# Patient Record
Sex: FEMALE | Race: WHITE | Employment: OTHER | URBAN - METROPOLITAN AREA
[De-identification: names, ages, dates, MRNs, and addresses within clinical notes are randomized per-mention and may not be internally consistent; named-entity substitution may affect disease eponyms.]

---

## 2019-09-29 ENCOUNTER — HOSPITAL ENCOUNTER (INPATIENT)
Age: 74
LOS: 2 days | Discharge: HOME | DRG: 069 | End: 2019-10-01
Attending: EMERGENCY MEDICINE | Admitting: INTERNAL MEDICINE
Payer: MEDICARE

## 2019-09-29 ENCOUNTER — APPOINTMENT (OUTPATIENT)
Dept: CT IMAGING | Age: 74
DRG: 069 | End: 2019-09-29
Attending: EMERGENCY MEDICINE
Payer: MEDICARE

## 2019-09-29 DIAGNOSIS — G45.1 TIA INVOLVING CAROTID ARTERY: ICD-10-CM

## 2019-09-29 DIAGNOSIS — G45.9 TIA (TRANSIENT ISCHEMIC ATTACK): ICD-10-CM

## 2019-09-29 DIAGNOSIS — I63.9 CEREBROVASCULAR ACCIDENT (CVA), UNSPECIFIED MECHANISM (HCC): Primary | ICD-10-CM

## 2019-09-29 LAB
ALBUMIN SERPL-MCNC: 3.7 G/DL (ref 3.5–5)
ALBUMIN/GLOB SERPL: 1.1 {RATIO} (ref 1.1–2.2)
ALP SERPL-CCNC: 81 U/L (ref 45–117)
ALT SERPL-CCNC: 35 U/L (ref 12–78)
ANION GAP SERPL CALC-SCNC: 6 MMOL/L (ref 5–15)
AST SERPL-CCNC: 31 U/L (ref 15–37)
BASOPHILS # BLD: 0.1 K/UL (ref 0–0.1)
BASOPHILS NFR BLD: 1 % (ref 0–1)
BILIRUB SERPL-MCNC: 0.5 MG/DL (ref 0.2–1)
BUN SERPL-MCNC: 28 MG/DL (ref 6–20)
BUN/CREAT SERPL: 29 (ref 12–20)
CALCIUM SERPL-MCNC: 8.9 MG/DL (ref 8.5–10.1)
CHLORIDE SERPL-SCNC: 102 MMOL/L (ref 97–108)
CO2 SERPL-SCNC: 30 MMOL/L (ref 21–32)
COMMENT, HOLDF: NORMAL
CREAT SERPL-MCNC: 0.98 MG/DL (ref 0.55–1.02)
DIFFERENTIAL METHOD BLD: ABNORMAL
EOSINOPHIL # BLD: 0.3 K/UL (ref 0–0.4)
EOSINOPHIL NFR BLD: 4 % (ref 0–7)
ERYTHROCYTE [DISTWIDTH] IN BLOOD BY AUTOMATED COUNT: 13.2 % (ref 11.5–14.5)
GLOBULIN SER CALC-MCNC: 3.3 G/DL (ref 2–4)
GLUCOSE BLD STRIP.AUTO-MCNC: 117 MG/DL (ref 65–100)
GLUCOSE SERPL-MCNC: 91 MG/DL (ref 65–100)
HCT VFR BLD AUTO: 40.3 % (ref 35–47)
HGB BLD-MCNC: 12.8 G/DL (ref 11.5–16)
IMM GRANULOCYTES # BLD AUTO: 0 K/UL (ref 0–0.04)
IMM GRANULOCYTES NFR BLD AUTO: 1 % (ref 0–0.5)
LYMPHOCYTES # BLD: 1.8 K/UL (ref 0.8–3.5)
LYMPHOCYTES NFR BLD: 29 % (ref 12–49)
MCH RBC QN AUTO: 32 PG (ref 26–34)
MCHC RBC AUTO-ENTMCNC: 31.8 G/DL (ref 30–36.5)
MCV RBC AUTO: 100.8 FL (ref 80–99)
MONOCYTES # BLD: 0.7 K/UL (ref 0–1)
MONOCYTES NFR BLD: 12 % (ref 5–13)
NEUTS SEG # BLD: 3.3 K/UL (ref 1.8–8)
NEUTS SEG NFR BLD: 53 % (ref 32–75)
NRBC # BLD: 0 K/UL (ref 0–0.01)
NRBC BLD-RTO: 0 PER 100 WBC
PLATELET # BLD AUTO: 148 K/UL (ref 150–400)
PMV BLD AUTO: 11.2 FL (ref 8.9–12.9)
POTASSIUM SERPL-SCNC: 3.5 MMOL/L (ref 3.5–5.1)
PROT SERPL-MCNC: 7 G/DL (ref 6.4–8.2)
RBC # BLD AUTO: 4 M/UL (ref 3.8–5.2)
SAMPLES BEING HELD,HOLD: NORMAL
SERVICE CMNT-IMP: ABNORMAL
SODIUM SERPL-SCNC: 138 MMOL/L (ref 136–145)
WBC # BLD AUTO: 6.2 K/UL (ref 3.6–11)

## 2019-09-29 PROCEDURE — 65660000000 HC RM CCU STEPDOWN

## 2019-09-29 PROCEDURE — 74011250636 HC RX REV CODE- 250/636

## 2019-09-29 PROCEDURE — 70450 CT HEAD/BRAIN W/O DYE: CPT

## 2019-09-29 PROCEDURE — 85025 COMPLETE CBC W/AUTO DIFF WBC: CPT

## 2019-09-29 PROCEDURE — 70496 CT ANGIOGRAPHY HEAD: CPT

## 2019-09-29 PROCEDURE — 93005 ELECTROCARDIOGRAM TRACING: CPT

## 2019-09-29 PROCEDURE — 82962 GLUCOSE BLOOD TEST: CPT

## 2019-09-29 PROCEDURE — 0042T CT CODE NEURO PERF W CBF: CPT

## 2019-09-29 PROCEDURE — 96375 TX/PRO/DX INJ NEW DRUG ADDON: CPT

## 2019-09-29 PROCEDURE — 36415 COLL VENOUS BLD VENIPUNCTURE: CPT

## 2019-09-29 PROCEDURE — 74011000258 HC RX REV CODE- 258: Performed by: RADIOLOGY

## 2019-09-29 PROCEDURE — 74011250636 HC RX REV CODE- 250/636: Performed by: EMERGENCY MEDICINE

## 2019-09-29 PROCEDURE — 80053 COMPREHEN METABOLIC PANEL: CPT

## 2019-09-29 PROCEDURE — 96374 THER/PROPH/DIAG INJ IV PUSH: CPT

## 2019-09-29 PROCEDURE — 99285 EMERGENCY DEPT VISIT HI MDM: CPT

## 2019-09-29 RX ORDER — ATORVASTATIN CALCIUM 10 MG/1
10 TABLET, FILM COATED ORAL DAILY
COMMUNITY
End: 2019-10-01

## 2019-09-29 RX ORDER — DIPHENHYDRAMINE HYDROCHLORIDE 50 MG/ML
25 INJECTION, SOLUTION INTRAMUSCULAR; INTRAVENOUS
Status: COMPLETED | OUTPATIENT
Start: 2019-09-29 | End: 2019-09-29

## 2019-09-29 RX ORDER — BISACODYL 5 MG
5 TABLET, DELAYED RELEASE (ENTERIC COATED) ORAL DAILY PRN
Status: DISCONTINUED | OUTPATIENT
Start: 2019-09-29 | End: 2019-10-01 | Stop reason: HOSPADM

## 2019-09-29 RX ORDER — ATORVASTATIN CALCIUM 10 MG/1
10 TABLET, FILM COATED ORAL DAILY
Status: DISCONTINUED | OUTPATIENT
Start: 2019-09-30 | End: 2019-10-01 | Stop reason: HOSPADM

## 2019-09-29 RX ORDER — ACETAMINOPHEN 325 MG/1
650 TABLET ORAL
Status: DISCONTINUED | OUTPATIENT
Start: 2019-09-29 | End: 2019-10-01 | Stop reason: HOSPADM

## 2019-09-29 RX ORDER — LOSARTAN POTASSIUM 25 MG/1
25 TABLET ORAL DAILY
Status: ON HOLD | COMMUNITY
End: 2022-10-08

## 2019-09-29 RX ORDER — HYDRALAZINE HYDROCHLORIDE 20 MG/ML
10 INJECTION INTRAMUSCULAR; INTRAVENOUS
Status: DISCONTINUED | OUTPATIENT
Start: 2019-09-29 | End: 2019-10-01 | Stop reason: HOSPADM

## 2019-09-29 RX ORDER — BUMETANIDE 1 MG/1
1 TABLET ORAL DAILY
COMMUNITY

## 2019-09-29 RX ORDER — SODIUM CHLORIDE 0.9 % (FLUSH) 0.9 %
10 SYRINGE (ML) INJECTION
Status: COMPLETED | OUTPATIENT
Start: 2019-09-29 | End: 2019-09-30

## 2019-09-29 RX ORDER — SODIUM CHLORIDE 0.9 % (FLUSH) 0.9 %
10 SYRINGE (ML) INJECTION
Status: CANCELLED | OUTPATIENT
Start: 2019-09-29 | End: 2019-09-29

## 2019-09-29 RX ORDER — FAMOTIDINE 10 MG/ML
INJECTION INTRAVENOUS
Status: DISPENSED
Start: 2019-09-29 | End: 2019-09-30

## 2019-09-29 RX ORDER — BISOPROLOL FUMARATE 5 MG/1
10 TABLET ORAL ONCE
Status: DISCONTINUED | OUTPATIENT
Start: 2019-09-29 | End: 2019-09-30

## 2019-09-29 RX ORDER — DICYCLOMINE HYDROCHLORIDE 10 MG/1
10 CAPSULE ORAL AS NEEDED
Status: DISCONTINUED | OUTPATIENT
Start: 2019-09-29 | End: 2019-10-01 | Stop reason: HOSPADM

## 2019-09-29 RX ORDER — ONDANSETRON 2 MG/ML
4 INJECTION INTRAMUSCULAR; INTRAVENOUS
Status: DISCONTINUED | OUTPATIENT
Start: 2019-09-29 | End: 2019-10-01 | Stop reason: HOSPADM

## 2019-09-29 RX ORDER — LOSARTAN POTASSIUM 50 MG/1
25 TABLET ORAL DAILY
Status: DISCONTINUED | OUTPATIENT
Start: 2019-09-30 | End: 2019-09-30

## 2019-09-29 RX ORDER — GUAIFENESIN 100 MG/5ML
81 LIQUID (ML) ORAL DAILY
Status: DISCONTINUED | OUTPATIENT
Start: 2019-09-30 | End: 2019-10-01 | Stop reason: HOSPADM

## 2019-09-29 RX ORDER — HEPARIN SODIUM 5000 [USP'U]/ML
5000 INJECTION, SOLUTION INTRAVENOUS; SUBCUTANEOUS EVERY 8 HOURS
Status: DISCONTINUED | OUTPATIENT
Start: 2019-09-30 | End: 2019-10-01 | Stop reason: HOSPADM

## 2019-09-29 RX ORDER — DIPHENHYDRAMINE HYDROCHLORIDE 50 MG/ML
INJECTION, SOLUTION INTRAMUSCULAR; INTRAVENOUS
Status: COMPLETED
Start: 2019-09-29 | End: 2019-09-29

## 2019-09-29 RX ORDER — WATER FOR INJECTION,STERILE
VIAL (ML) INJECTION
Status: DISPENSED
Start: 2019-09-29 | End: 2019-09-30

## 2019-09-29 RX ORDER — BUMETANIDE 1 MG/1
1 TABLET ORAL DAILY
Status: DISCONTINUED | OUTPATIENT
Start: 2019-09-30 | End: 2019-10-01 | Stop reason: HOSPADM

## 2019-09-29 RX ORDER — BISOPROLOL FUMARATE 5 MG/1
10 TABLET ORAL 2 TIMES DAILY
Status: DISCONTINUED | OUTPATIENT
Start: 2019-09-30 | End: 2019-09-30

## 2019-09-29 RX ORDER — SODIUM CHLORIDE, SODIUM LACTATE, POTASSIUM CHLORIDE, CALCIUM CHLORIDE 600; 310; 30; 20 MG/100ML; MG/100ML; MG/100ML; MG/100ML
75 INJECTION, SOLUTION INTRAVENOUS CONTINUOUS
Status: DISCONTINUED | OUTPATIENT
Start: 2019-09-30 | End: 2019-10-01 | Stop reason: HOSPADM

## 2019-09-29 RX ORDER — ACETAMINOPHEN 650 MG/1
650 SUPPOSITORY RECTAL
Status: DISCONTINUED | OUTPATIENT
Start: 2019-09-29 | End: 2019-10-01 | Stop reason: HOSPADM

## 2019-09-29 RX ORDER — VERAPAMIL HYDROCHLORIDE 240 MG/1
240 TABLET, FILM COATED, EXTENDED RELEASE ORAL
Status: DISCONTINUED | OUTPATIENT
Start: 2019-09-29 | End: 2019-09-29

## 2019-09-29 RX ORDER — TRAZODONE HYDROCHLORIDE 50 MG/1
150 TABLET ORAL
Status: DISCONTINUED | OUTPATIENT
Start: 2019-09-29 | End: 2019-10-01 | Stop reason: HOSPADM

## 2019-09-29 RX ORDER — VERAPAMIL HYDROCHLORIDE 80 MG/1
120 TABLET ORAL
Status: DISCONTINUED | OUTPATIENT
Start: 2019-09-30 | End: 2019-09-30

## 2019-09-29 RX ADMIN — IOPAMIDOL 120 ML: 755 INJECTION, SOLUTION INTRAVENOUS at 20:17

## 2019-09-29 RX ADMIN — FAMOTIDINE 20 MG: 10 INJECTION, SOLUTION INTRAVENOUS at 20:45

## 2019-09-29 RX ADMIN — DIPHENHYDRAMINE HYDROCHLORIDE 25 MG: 50 INJECTION, SOLUTION INTRAMUSCULAR; INTRAVENOUS at 20:25

## 2019-09-29 RX ADMIN — METHYLPREDNISOLONE SODIUM SUCCINATE 125 MG: 125 INJECTION, POWDER, FOR SOLUTION INTRAMUSCULAR; INTRAVENOUS at 20:25

## 2019-09-29 RX ADMIN — SODIUM CHLORIDE 100 ML: 900 INJECTION, SOLUTION INTRAVENOUS at 20:17

## 2019-09-30 ENCOUNTER — APPOINTMENT (OUTPATIENT)
Dept: NON INVASIVE DIAGNOSTICS | Age: 74
DRG: 069 | End: 2019-09-30
Attending: INTERNAL MEDICINE
Payer: MEDICARE

## 2019-09-30 LAB
ALBUMIN SERPL-MCNC: 4.1 G/DL (ref 3.5–5)
ALBUMIN/GLOB SERPL: 1.2 {RATIO} (ref 1.1–2.2)
ALP SERPL-CCNC: 84 U/L (ref 45–117)
ALT SERPL-CCNC: 36 U/L (ref 12–78)
ANION GAP SERPL CALC-SCNC: 6 MMOL/L (ref 5–15)
AST SERPL-CCNC: 33 U/L (ref 15–37)
ATRIAL RATE: 55 BPM
BASOPHILS # BLD: 0.1 K/UL (ref 0–0.1)
BASOPHILS NFR BLD: 1 % (ref 0–1)
BILIRUB SERPL-MCNC: 0.6 MG/DL (ref 0.2–1)
BUN SERPL-MCNC: 28 MG/DL (ref 6–20)
BUN/CREAT SERPL: 26 (ref 12–20)
CALCIUM SERPL-MCNC: 9.6 MG/DL (ref 8.5–10.1)
CALCULATED P AXIS, ECG09: 54 DEGREES
CALCULATED R AXIS, ECG10: -83 DEGREES
CALCULATED T AXIS, ECG11: 81 DEGREES
CHLORIDE SERPL-SCNC: 104 MMOL/L (ref 97–108)
CHOLEST SERPL-MCNC: 167 MG/DL
CO2 SERPL-SCNC: 28 MMOL/L (ref 21–32)
CREAT SERPL-MCNC: 1.09 MG/DL (ref 0.55–1.02)
CRP SERPL-MCNC: <0.29 MG/DL (ref 0–0.6)
DIAGNOSIS, 93000: NORMAL
DIFFERENTIAL METHOD BLD: ABNORMAL
ECHO AO ROOT DIAM: 3.76 CM
ECHO AV AREA PEAK VELOCITY: 2.1 CM2
ECHO AV AREA VTI: 2.3 CM2
ECHO AV MEAN GRADIENT: 14 MMHG
ECHO AV PEAK GRADIENT: 27.1 MMHG
ECHO AV PEAK VELOCITY: 260.45 CM/S
ECHO AV VTI: 59.38 CM
ECHO EST RA PRESSURE: 10 MMHG
ECHO LA AREA 4C: 28.8 CM2
ECHO LA MAJOR AXIS: 4.62 CM
ECHO LA TO AORTIC ROOT RATIO: 1.23
ECHO LA VOL 2C: 106.27 ML (ref 22–52)
ECHO LA VOL 4C: 87.16 ML (ref 22–52)
ECHO LA VOL BP: 104.26 ML (ref 22–52)
ECHO LV E' LATERAL VELOCITY: 7.98 CM/S
ECHO LV E' SEPTAL VELOCITY: 5.94 CM/S
ECHO LV INTERNAL DIMENSION DIASTOLIC: 5.17 CM (ref 3.9–5.3)
ECHO LV INTERNAL DIMENSION SYSTOLIC: 3.5 CM
ECHO LV IVSD: 1.16 CM (ref 0.6–0.9)
ECHO LV MASS 2D: 259 G (ref 67–162)
ECHO LV POSTERIOR WALL DIASTOLIC: 1.04 CM (ref 0.6–0.9)
ECHO LVOT DIAM: 1.9 CM
ECHO LVOT PEAK GRADIENT: 14.8 MMHG
ECHO LVOT PEAK VELOCITY: 192.29 CM/S
ECHO LVOT SV: 133.8 ML
ECHO LVOT VTI: 47.22 CM
ECHO MV A VELOCITY: 99.5 CM/S
ECHO MV AREA PHT: 2 CM2
ECHO MV E DECELERATION TIME (DT): 378.1 MS
ECHO MV E VELOCITY: 84.72 CM/S
ECHO MV E/A RATIO: 0.85
ECHO MV E/E' LATERAL: 10.62
ECHO MV E/E' RATIO (AVERAGED): 12.44
ECHO MV E/E' SEPTAL: 14.26
ECHO MV PRESSURE HALF TIME (PHT): 109.6 MS
ECHO MV REGURGITANT PEAK GRADIENT: 164.4 MMHG
ECHO MV REGURGITANT PEAK VELOCITY: 641.13 CM/S
ECHO PULMONARY ARTERY SYSTOLIC PRESSURE (PASP): 70.2 MMHG
ECHO PV MAX VELOCITY: 119.14 CM/S
ECHO PV PEAK GRADIENT: 5.7 MMHG
ECHO RIGHT VENTRICULAR SYSTOLIC PRESSURE (RVSP): 70.2 MMHG
ECHO RV INTERNAL DIMENSION: 5.87 CM
ECHO RV TAPSE: 2.7 CM (ref 1.5–2)
ECHO TV REGURGITANT MAX VELOCITY: 387.89 CM/S
ECHO TV REGURGITANT PEAK GRADIENT: 60.2 MMHG
EOSINOPHIL # BLD: 0 K/UL (ref 0–0.4)
EOSINOPHIL NFR BLD: 0 % (ref 0–7)
ERYTHROCYTE [DISTWIDTH] IN BLOOD BY AUTOMATED COUNT: 12.9 % (ref 11.5–14.5)
ERYTHROCYTE [SEDIMENTATION RATE] IN BLOOD: 9 MM/HR (ref 0–30)
EST. AVERAGE GLUCOSE BLD GHB EST-MCNC: 123 MG/DL
GLOBULIN SER CALC-MCNC: 3.3 G/DL (ref 2–4)
GLUCOSE SERPL-MCNC: 206 MG/DL (ref 65–100)
HBA1C MFR BLD: 5.9 % (ref 4.2–6.3)
HCT VFR BLD AUTO: 42.5 % (ref 35–47)
HDLC SERPL-MCNC: 68 MG/DL
HDLC SERPL: 2.5 {RATIO} (ref 0–5)
HGB BLD-MCNC: 13.8 G/DL (ref 11.5–16)
IMM GRANULOCYTES # BLD AUTO: 0 K/UL
IMM GRANULOCYTES NFR BLD AUTO: 0 %
LDLC SERPL CALC-MCNC: 89 MG/DL (ref 0–100)
LIPID PROFILE,FLP: NORMAL
LYMPHOCYTES # BLD: 0.4 K/UL (ref 0.8–3.5)
LYMPHOCYTES NFR BLD: 7 % (ref 12–49)
MAGNESIUM SERPL-MCNC: 2.4 MG/DL (ref 1.6–2.4)
MCH RBC QN AUTO: 31.8 PG (ref 26–34)
MCHC RBC AUTO-ENTMCNC: 32.5 G/DL (ref 30–36.5)
MCV RBC AUTO: 97.9 FL (ref 80–99)
MONOCYTES # BLD: 0.2 K/UL (ref 0–1)
MONOCYTES NFR BLD: 3 % (ref 5–13)
NEUTS SEG # BLD: 5.7 K/UL (ref 1.8–8)
NEUTS SEG NFR BLD: 89 % (ref 32–75)
NRBC # BLD: 0 K/UL (ref 0–0.01)
NRBC BLD-RTO: 0 PER 100 WBC
P-R INTERVAL, ECG05: 208 MS
PHOSPHATE SERPL-MCNC: 3 MG/DL (ref 2.6–4.7)
PLATELET # BLD AUTO: 150 K/UL (ref 150–400)
PMV BLD AUTO: 10.9 FL (ref 8.9–12.9)
POTASSIUM SERPL-SCNC: 3.8 MMOL/L (ref 3.5–5.1)
PROT SERPL-MCNC: 7.4 G/DL (ref 6.4–8.2)
Q-T INTERVAL, ECG07: 526 MS
QRS DURATION, ECG06: 178 MS
QTC CALCULATION (BEZET), ECG08: 503 MS
RBC # BLD AUTO: 4.34 M/UL (ref 3.8–5.2)
RBC MORPH BLD: ABNORMAL
SODIUM SERPL-SCNC: 138 MMOL/L (ref 136–145)
TRIGL SERPL-MCNC: 50 MG/DL (ref ?–150)
TROPONIN I SERPL-MCNC: <0.05 NG/ML
TROPONIN I SERPL-MCNC: <0.05 NG/ML
TSH SERPL DL<=0.05 MIU/L-ACNC: 0.61 UIU/ML (ref 0.36–3.74)
VENTRICULAR RATE, ECG03: 55 BPM
VIT B12 SERPL-MCNC: 452 PG/ML (ref 193–986)
VLDLC SERPL CALC-MCNC: 10 MG/DL
WBC # BLD AUTO: 6.4 K/UL (ref 3.6–11)

## 2019-09-30 PROCEDURE — 96374 THER/PROPH/DIAG INJ IV PUSH: CPT

## 2019-09-30 PROCEDURE — 85025 COMPLETE CBC W/AUTO DIFF WBC: CPT

## 2019-09-30 PROCEDURE — 86140 C-REACTIVE PROTEIN: CPT

## 2019-09-30 PROCEDURE — 97161 PT EVAL LOW COMPLEX 20 MIN: CPT

## 2019-09-30 PROCEDURE — 80053 COMPREHEN METABOLIC PANEL: CPT

## 2019-09-30 PROCEDURE — 82607 VITAMIN B-12: CPT

## 2019-09-30 PROCEDURE — 97530 THERAPEUTIC ACTIVITIES: CPT

## 2019-09-30 PROCEDURE — 80061 LIPID PANEL: CPT

## 2019-09-30 PROCEDURE — 36415 COLL VENOUS BLD VENIPUNCTURE: CPT

## 2019-09-30 PROCEDURE — 97165 OT EVAL LOW COMPLEX 30 MIN: CPT

## 2019-09-30 PROCEDURE — 84484 ASSAY OF TROPONIN QUANT: CPT

## 2019-09-30 PROCEDURE — 74011250636 HC RX REV CODE- 250/636: Performed by: INTERNAL MEDICINE

## 2019-09-30 PROCEDURE — 92610 EVALUATE SWALLOWING FUNCTION: CPT | Performed by: SPEECH-LANGUAGE PATHOLOGIST

## 2019-09-30 PROCEDURE — 83036 HEMOGLOBIN GLYCOSYLATED A1C: CPT

## 2019-09-30 PROCEDURE — 74011250637 HC RX REV CODE- 250/637: Performed by: INTERNAL MEDICINE

## 2019-09-30 PROCEDURE — 65660000000 HC RM CCU STEPDOWN

## 2019-09-30 PROCEDURE — 97116 GAIT TRAINING THERAPY: CPT

## 2019-09-30 PROCEDURE — 74011636320 HC RX REV CODE- 636/320: Performed by: RADIOLOGY

## 2019-09-30 PROCEDURE — 74011250637 HC RX REV CODE- 250/637: Performed by: PSYCHIATRY & NEUROLOGY

## 2019-09-30 PROCEDURE — 84100 ASSAY OF PHOSPHORUS: CPT

## 2019-09-30 PROCEDURE — 83735 ASSAY OF MAGNESIUM: CPT

## 2019-09-30 PROCEDURE — 85652 RBC SED RATE AUTOMATED: CPT

## 2019-09-30 PROCEDURE — 84443 ASSAY THYROID STIM HORMONE: CPT

## 2019-09-30 RX ORDER — CLOPIDOGREL BISULFATE 75 MG/1
75 TABLET ORAL DAILY
Status: DISCONTINUED | OUTPATIENT
Start: 2019-09-30 | End: 2019-10-01 | Stop reason: HOSPADM

## 2019-09-30 RX ORDER — LABETALOL HYDROCHLORIDE 5 MG/ML
5 INJECTION, SOLUTION INTRAVENOUS
Status: DISCONTINUED | OUTPATIENT
Start: 2019-09-30 | End: 2019-10-01 | Stop reason: HOSPADM

## 2019-09-30 RX ADMIN — TRAZODONE HYDROCHLORIDE 150 MG: 50 TABLET ORAL at 02:19

## 2019-09-30 RX ADMIN — CLOPIDOGREL BISULFATE 75 MG: 75 TABLET ORAL at 11:43

## 2019-09-30 RX ADMIN — BUMETANIDE 1 MG: 1 TABLET ORAL at 08:16

## 2019-09-30 RX ADMIN — HYDRALAZINE HYDROCHLORIDE 10 MG: 20 INJECTION INTRAMUSCULAR; INTRAVENOUS at 00:28

## 2019-09-30 RX ADMIN — LOSARTAN POTASSIUM 25 MG: 50 TABLET ORAL at 08:16

## 2019-09-30 RX ADMIN — SODIUM CHLORIDE, SODIUM LACTATE, POTASSIUM CHLORIDE, AND CALCIUM CHLORIDE 100 ML/HR: 600; 310; 30; 20 INJECTION, SOLUTION INTRAVENOUS at 00:29

## 2019-09-30 RX ADMIN — ASPIRIN 81 MG CHEWABLE TABLET 81 MG: 81 TABLET CHEWABLE at 08:16

## 2019-09-30 RX ADMIN — ATORVASTATIN CALCIUM 10 MG: 10 TABLET, FILM COATED ORAL at 08:16

## 2019-09-30 RX ADMIN — BISOPROLOL FUMARATE 10 MG: 5 TABLET ORAL at 08:21

## 2019-09-30 RX ADMIN — Medication 10 ML: at 00:30

## 2019-09-30 RX ADMIN — TRAZODONE HYDROCHLORIDE 100 MG: 50 TABLET ORAL at 21:43

## 2019-09-30 RX ADMIN — ACETAMINOPHEN 650 MG: 325 TABLET, FILM COATED ORAL at 09:50

## 2019-09-30 NOTE — PROGRESS NOTES
Speech Pathology bedside swallow evaluation/discharge  Patient: Waldemar Carpenter (9 y.o. female)  Date: 9/30/2019  Primary Diagnosis: Acute CVA (cerebrovascular accident) [facility]) [I63.9]       Precautions:        ASSESSMENT :  Based on the objective data described below, the patient presents with intact swallow. Seen with lunch meal. No difficulty. Patient also reports she had dysarthria yesterday at time of admission but it back to normal today and speech is very clear. .  Skilled acute therapy provided by a speech-language pathologist is not indicated at this time. PLAN :  Recommendations:  Regular diet  Upright for PO  Discharge Recommendations: None     SUBJECTIVE:   Patient stated You can watch me eat but I'm fine.     OBJECTIVE:     Past Medical History:   Diagnosis Date    Aortic stenosis 10/27/2010    Aortic valve replaced 10/27/2010    Atrioventricular block, complete ([facility] [address] 10/27/2010    CAD (coronary artery disease)     Cancer ([facility] [address] 2004    h/o breast cancer    Depression     Essential hypertension     Fatigue 10/27/2010    HTN (hypertension) 10/27/2010    S/P cardiac pacemaker procedure 10/27/2010    Unspecified sleep apnea     uses CPAP     Past Surgical History:   Procedure Laterality Date    ABDOMEN SURGERY PROC UNLISTED      tummy tuck    BREAST SURGERY PROCEDURE UNLISTED      mastectomy    HX HEART CATHETERIZATION      HX HEART VALVE SURGERY  3/2010    porcine AVR    HX HERNIA REPAIR      HX PACEMAKER  3/24/2010    [facility] [address], complete heart block    HX TONSILLECTOMY       Prior Level of Function/Home Situation:   Home Situation  Home Environment: (P) Private residence  # Steps to Enter: (P) 10(from basement, main entrance)  One/Two Story Residence: (P) One story(once up from basement)  Living Alone: (P) No(with )  Support Systems: (P) Spouse/Significant Other/Partner  Patient Expects to be Discharged to[de-identified] Private residence  Current DME Used/Available at Home: (P) CPAP(tall toilet)  Tub or Shower Type: (P) Shower  Diet prior to admission: unrestricted  Current Diet:  same   Cognitive and Communication Status:  Neurologic State: Alert  Orientation Level: Oriented X4  Cognition: Appropriate decision making  Perception: Appears intact  Perseveration: No perseveration noted  Safety/Judgement: Awareness of environment  Oral Assessment:  Oral Assessment  Labial: (mild asymmetry, patient reports this is baseline 2/2 hx of Bell's Palsy)  Dentition: Upper dentures(patient is very plesed with her dentures)  Oral Hygiene: clean tongue   Lingual: No impairment  Mandible: No impairment  P.O. Trials:     Vocal quality prior to P.O.: No impairment(age appropriate )  Consistency Presented: Solid; Thin liquid  How Presented: Cup/sip;Cup/gulp(self feeding, seen with lunch meal )     Bolus Acceptance: No impairment  Bolus Formation/Control: No impairment        Oral Residue: None  Initiation of Swallow: No impairment     Aspiration Signs/Symptoms: None  Pharyngeal Phase Characteristics: No impairment, issues, or problems      Cues for Modifications: None       Oral Phase Severity: No impairment  Pharyngeal Phase Severity : No impairment  NOMS:   The NOMS functional outcome measure was used to quantify this patient's level of swallowing impairment. Based on the NOMS, the patient was determined to be at level 7 for swallow function     NOMS Swallowing Levels:  Level 1 (CN): NPO  Level 2 (CM): NPO but takes consistency in therapy  Level 3 (CL): Takes less than 50% of nutrition p.o. and continues with nonoral feedings; and/or safe with mod cues; and/or max diet restriction  Level 4 (CK):  Safe swallow but needs mod cues; and/or mod diet restriction; and/or still requires some nonoral feeding/supplements  Level 5 (CJ): Safe swallow with min diet restriction; and/or needs min cues  Level 6 (CI): Independent with p.o.; rare cues; usually self cues; may need to avoid some foods or needs extra time  Level 7 ([address] [place] [address]): Independent for all p.o.  MOUNA. (2003). National Outcomes Measurement System (NOMS): Adult Speech-Language Pathology User's Guide. Pain:  Pain Scale 1: Numeric (0 - 10)  Pain Intensity 1: 6  Pain Location 1: Head  After treatment:   [x] Patient left in no apparent distress sitting up in chair  [] Patient left in no apparent distress in bed  [x] Call bell left within reach  [] Nursing notified  [] Caregiver present  [] Bed alarm activated    COMMUNICATION/EDUCATION:   The patients plan of care including findings, recommendations, and recommended diet changes were discussed with: Registered Nurse. Patient was educated regarding reasons for swallow eval as this relates to Her diagnosis of neuro work up/rule of CVA. She demonstrated Excellent understanding as evidenced by her responses. [] Posted safety precautions in patient's room. [x] Patient/family have participated as able and agree with findings and recommendations. [] Patient is unable to participate in plan of care at this time.     Thank you for this referral.  NETTIE Tirado  Time Calculation: 16 mins

## 2019-09-30 NOTE — PROGRESS NOTES
PHYSICAL THERAPY EVALUATION/DISCHARGE  Patient: Elaina Becerra (47 y.o. female)  Date: 9/30/2019  Primary Diagnosis: Acute CVA (cerebrovascular accident) [facility]) [I63.9]       Precautions:          ASSESSMENT  Based on the objective data described below, the patient presents with overall mobility near baseline function s/p admission for CVA work-up. Patient with intact sensation, vision and strength bilaterally. She reports all symptoms have resolved, though reports a fogginess which she attributes to a new BP medication. Patient independent with transfers, ambulation, and stair navigation. She demonstrated increased trunk sway and trendelenburg, however reports this is baseline and she demonstrated no overt LOB throughout. Patient educated on BE FAST acronym for signs and symptoms of stroke and handout was provided. Patient reports her mobility is at baseline and she has no questions/concerns, or PT needs at this time. Recommending d/c home without further PT services once medically cleared. Will sign off but remain available if needs should arrive prior to discharge. Functional Outcome Measure: The patient scored 49/56 on the Garcia outcome measure which is indicative of low fall risk. Other factors to consider for discharge: n/a     Further skilled acute physical therapy is not indicated at this time. PLAN :  Recommendation for discharge: (in order for the patient to meet his/her long term goals)  No skilled physical therapy/ follow up rehabilitation needs identified at this time. This discharge recommendation:  Has not yet been discussed the attending provider and/or case management    Equipment recommendations for successful discharge: none       SUBJECTIVE:   Patient stated I feel unsteady, but I've been like this for a while and all women my age are like this.     OBJECTIVE DATA SUMMARY:   HISTORY:    Past Medical History:   Diagnosis Date    Aortic stenosis 10/27/2010    Aortic valve replaced 10/27/2010    Atrioventricular block, complete ([facility] [address] 10/27/2010    CAD (coronary artery disease)     Cancer ([facility] [address] 2004    h/o breast cancer    Depression     Essential hypertension     Fatigue 10/27/2010    HTN (hypertension) 10/27/2010    S/P cardiac pacemaker procedure 10/27/2010    Unspecified sleep apnea     uses CPAP     Past Surgical History:   Procedure Laterality Date    ABDOMEN SURGERY PROC UNLISTED      tummy tuck    BREAST SURGERY PROCEDURE UNLISTED      mastectomy    HX HEART CATHETERIZATION      HX HEART VALVE SURGERY  3/2010    porcine AVR    HX HERNIA REPAIR      HX PACEMAKER  3/24/2010    [facility], complete heart block    HX TONSILLECTOMY         Prior level of function: Fully independent, lives with   Personal factors and/or comorbidities impacting plan of care: hx near falls, stairs, resolved symptoms, dizziness     Home Situation  Home Environment: Private residence  # Steps to Enter: 10(from basement, main entrance)  Rails to Enter: Yes  Hand Rails : Left  One/Two Story Residence: One story(once up from basement)  Living Alone: No(with )  Support Systems: Spouse/Significant Other/Partner  Patient Expects to be Discharged to[de-identified] Private residence  Current DME Used/Available at Home: CPAP(tall toilet)  Tub or Shower Type: Shower    EXAMINATION/PRESENTATION/DECISION MAKING:   Critical Behavior:  Neurologic State: Alert  Orientation Level: Oriented X4  Cognition: Appropriate decision making  Safety/Judgement: Awareness of environment  Hearing: Auditory  Auditory Impairment: None  Skin:    Edema:   Range Of Motion:  AROM: Within functional limits           PROM: Within functional limits           Strength:    Strength:  Within functional limits                    Tone & Sensation:   Tone: Normal              Sensation: Intact               Coordination:  Coordination: Within functional limits  Vision:      Functional Mobility:  Bed Mobility: Supine to Sit: (received in chair)  Sit to Supine: (ended in chair)     Transfers:  Sit to Stand: Independent  Stand to Sit: Independent                       Balance:   Sitting: Intact  Standing: Intact  Ambulation/Gait Training:  Distance (ft): 300 Feet (ft)     Ambulation - Level of Assistance: Independent        Gait Abnormalities: Decreased step clearance;Shuffling gait;Trunk sway increased;Trendelenburg                                       Stairs:  Number of Stairs Trained: 16  Stairs - Level of Assistance: Modified independent   Rail Use: Left   Functional Measure:  Garcia Balance Test:    Sitting to Standin  Standing Unsupported: 4  Sitting with Back Unsupported: 4  Standing to Sittin  Transfers: 4  Standing Unsupported with Eyes Closed: 2  Standing Unsupported with Feet Together: 3  Reach Forward with Outstretched Arm: 4   Object: 4  Turn to Look Over Shoulders: 4  Turn 360 Degrees: 4  Alternate Foot on Step/Stool: 4  Standing Unsupported One Foot in Front: 3  Stand on One Le  Total: 49/56         56=Maximum possible score;   0-20=High fall risk  21-40=Moderate fall risk   41-56=Low fall risk          Physical Therapy Evaluation Charge Determination   History Examination Presentation Decision-Making   HIGH Complexity :3+ comorbidities / personal factors will impact the outcome/ POC  HIGH Complexity : 4+ Standardized tests and measures addressing body structure, function, activity limitation and / or participation in recreation  LOW Complexity : Stable, uncomplicated  LOW Complexity : FOTO score of       Based on the above components, the patient evaluation is determined to be of the following complexity level: LOW     Activity Tolerance:   Good  Please refer to the flowsheet for vital signs taken during this treatment.     After treatment patient left in no apparent distress:   Sitting in chair and Call bell within reach    COMMUNICATION/EDUCATION:   The patients plan of care was discussed with: Occupational Therapist and Registered Nurse. Fall prevention education was provided and the patient/caregiver indicated understanding., Patient/family have participated as able in goal setting and plan of care. and Patient/family agree to work toward stated goals and plan of care.     Thank you for this referral.  Opal Evans, PT, DPT   Time Calculation: 15 mins

## 2019-09-30 NOTE — PROGRESS NOTES
The program assesses the family and/or caregiver's readiness, willingness, and ability to provide or support and self-management activities for the patient as needed. CALEB plan:  -cardiology consult pending  -echo pending  -return home when medically stable        Reason for Admission:   CVA                   RRAT Score:    14                 Plan for utilizing home health:   Not needed at this time                       Current Advanced Directive/Advance Care Plan: not on file, patient is a full code. Transition of Care Plan:  Patient has been evaluated by PT and OT and has no needs at discharge. Cm will be available if anything arises. Care Management Interventions  PCP Verified by CM: Yes  Palliative Care Criteria Met (RRAT>21 & CHF Dx)?: No  Transition of Care Consult (CM Consult): Discharge Planning  MyChart Signup: No  Discharge Durable Medical Equipment: No  Health Maintenance Reviewed: Yes  Physical Therapy Consult: Yes  Occupational Therapy Consult: Yes  Speech Therapy Consult: Yes  Freedom of Choice Offered: Yes  [place]ay Resource Information Provided?: No  Discharge Location  Discharge Placement: Home with family assistance    Radha Garcia

## 2019-09-30 NOTE — PROGRESS NOTES
Occupational Therapy    Occupational therapy evaluation completed. Full documentation to follow. Recommend d/c home when medically stable with no additional OT needs.     Fredi Russell, OTR/L

## 2019-09-30 NOTE — ED NOTES
Quick Look:  Patient presents to the emergency department reporting slurred speech, blurred vision, and favoring her left. Last know well 1400. Code S Level 2 called from triage, and patient brought directly to CT where she was met by Dr. Taniya Kruse and Neuro NP. Patient's blood sugar 117.

## 2019-09-30 NOTE — PROGRESS NOTES
Came to see patient. She is off the floor for testing. Will follow up later today when she returns.      Thanks,       Karina Davis M.S., [id number] [facility]  Speech-Language Pathologist

## 2019-09-30 NOTE — PROGRESS NOTES
OCCUPATIONAL THERAPY EVALUATION/DISCHARGE  Patient: Xiomara Bender (60 y.o. female)  Date: 9/30/2019  Primary Diagnosis: Acute CVA (cerebrovascular accident) [facility]) [I63.9]       Precautions: none      ASSESSMENT  Based on the objective data described below, the patient presents at functional baseline, independent with ADLs and functional mobility. Patient c/o mild dizziness but demonstrated no LOB. Balance, sensation, vision, perception intact. BUE AROM/ coordination/ strength equal and intact. Patient has no OT needs at this time. Receptive to [employer] education. Current Level of Function (ADLs/self-care): independent    Functional Outcome Measure: The patient scored Total A-D  Total A-D (Motor Function): 66/66 on the Fugl-Shepard Assessment with each UE which is indicative of no impairment in upper extremity functional status. PLAN :  Recommendation for discharge: (in order for the patient to meet his/her long term goals)  No skilled occupational therapy/ follow up rehabilitation needs identified at this time. This discharge recommendation:  Has been made in collaboration with the attending provider and/or case management       SUBJECTIVE:   Patient stated I'm a little dizzy but much better.     OBJECTIVE DATA SUMMARY:   HISTORY:   Past Medical History:   Diagnosis Date    Aortic stenosis 10/27/2010    Aortic valve replaced 10/27/2010    Atrioventricular block, complete ([address] 10/27/2010    CAD (coronary artery disease)     Cancer ([address] 2004    h/o breast cancer    Depression     Essential hypertension     Fatigue 10/27/2010    HTN (hypertension) 10/27/2010    S/P cardiac pacemaker procedure 10/27/2010    Unspecified sleep apnea     uses CPAP     Past Surgical History:   Procedure Laterality Date    ABDOMEN SURGERY PROC UNLISTED      tummy tuck    BREAST SURGERY PROCEDURE UNLISTED      mastectomy    HX HEART CATHETERIZATION      HX HEART VALVE SURGERY  3/2010    porcine AVR    HX HERNIA REPAIR      HX PACEMAKER  3/24/2010    [facility], complete heart block    HX TONSILLECTOMY         Prior Level of Function/Environment/Context: independent  Expanded or extensive additional review of patient history:     Home Situation  Home Environment: Private residence  # Steps to Enter: 10(from basement, main entrance)  Rails to Enter: Yes  Hand Rails : Left  One/Two Story Residence: One story(once up from basement)  Living Alone: No(with )  Support Systems: Spouse/Significant Other/Partner  Patient Expects to be Discharged to[de-identified] Private residence  Current DME Used/Available at Home: CPAP(tall toilet)  Tub or Shower Type: Shower    Hand dominance: Right    EXAMINATION OF PERFORMANCE DEFICITS:  Cognitive/Behavioral Status:  Neurologic State: Alert  Orientation Level: Oriented X4  Cognition: Appropriate decision making  Perception: Appears intact  Perseveration: No perseveration noted  Safety/Judgement: Awareness of environment    Skin: visible skin appears intact    Edema: none noted    Hearing: Auditory  Auditory Impairment: None    Vision/Perceptual:    Tracking: Able to track stimulus in all quadrants w/o difficulty              Visual Fields: (able to detect stimuli in all fields)  Diplopia: No    Acuity: Within Defined Limits;Able to read clock/calendar on wall without difficulty; Able to read employee name badge without difficulty         Range of Motion:    AROM: Within functional limits  PROM: Within functional limits                      Strength:    Strength: Within functional limits                Coordination:  Coordination: Within functional limits  Fine Motor Skills-Upper: Left Intact; Right Intact    Gross Motor Skills-Upper: Left Intact; Right Intact    Tone & Sensation:    Tone: Normal  Sensation: Intact                      Balance:  Sitting: Intact  Standing: Intact    Functional Mobility and Transfers for ADLs:  Bed Mobility:  Supine to Sit: (received in chair)  Sit to Supine: (ended in chair)    Transfers:  Sit to Stand: Independent  Stand to Sit: Independent    ADL Assessment:  Feeding: Independent    Oral Facial Hygiene/Grooming: Independent    Bathing: Independent    Upper Body Dressing: Independent    Lower Body Dressing: Independent    Toileting: Independent                ADL Intervention and task modifications:               Cognitive Retraining  Safety/Judgement: Awareness of environment    Functional Measure:  Fugl-Shepard Assessment of Motor Recovery after Stroke:   Reflex Activity  Flexors/Biceps/Fingers: Can be elicited  Extensors/Triceps: Can be elicited  Reflex Subtotal: 4    Volitional Movement Within Synergies  Shoulder Retraction: Full  Shoulder Elevation: Full  Shoulder Abduction (90 degrees): Full  Shoulder External Rotation: Full  Elbow Flexion: Full  Forearm Supination: Full  Shoulder Adduction/Internal Rotation: Full  Elbow Extension: Full  Forearm Pronation: Full  Subtotal: 18    Volitional Movement Mixing Synergies  Hand to Lumbar Spine: Full  Shoulder Flexion (0-90 degrees): Full  Pronation-Supination: Full  Subtotal: 6    Volitional Movement With Little or No Synergy  Shoulder Abduction (0-90 degrees): Full  Shoulder Flexion ( degrees): Full  Pronation/Supination: Full  Subtotal : 6    Normal Reflex Activity  Biceps, Triceps, Finger Flexors:  Full  Subtotal : 2    Upper Extremity Total   Upper Extremity Total: 36    Wrist  Stability at 15 Degree Dorsiflexion: Full  Repeated Dorsiflexion/ Volar Flexion: Full  Stability at 15 Degree Dorsiflexion: Full  Repeated Dorsiflexion/ Volar Flexion: Full  Circumduction: Full  Wrist Total: 10    Hand  Mass Flexion: Full  Mass Extension: Full  Grasp A: Full  Grasp B: Full  Grasp C: Full  Grasp D: Full  Grasp E: Full  Hand Total: 14    Coordination/Speed  Tremor: None  Dysmetria: None  Time: <1s  Coordination/Speed Total : 6    Total A-D  Total A-D (Motor Function): 66/66     This is a reliable/valid measure of arm function after a neurological event. It has established value to characterize functional status and for measuring spontaneous and therapy-induced recovery; tests proximal and distal motor functions. Fugl-Shepard Assessment - UE scores recorded between five and 30 days post neurologic event can be used to predict UE recovery at six months post neurologic event. Severe = 0-21 points   Moderately Severe = 22-33 points   Moderate = 34-47 points   Mild = 48-66 points  CARLITOS Apple, ABBI Valdes, & AUNDREA Riggs (1992). Measurement of motor recovery after stroke: Outcome assessment and sample size requirements.  Stroke, 23, pp. 3734-5275.   ------------------------------------------------------------------------------------------------------------------------------------------------------------------  MCID:  Stroke:   Dago Angel et al, 2001; n = 171; mean age 79 (6) years; assessed within 16 (12) days of stroke, Acute Stroke)  FMA Motor Scores from Admission to Discharge   10 point increase in FMA Upper Extremity = 1.5 change in discharge FIM   10 point increase in FMA Lower Extremity = 1.9 change in discharge FIM  MDC:   Stroke:   Ty Lanes et al, 2008, n = 14, mean age = 59.9 (14.6) years, assessed on average 14 (6.5) months post stroke, Chronic Stroke)   FMA = 5.2 points for the Upper Extremity portion of the assessment     Occupational Therapy Evaluation Charge Determination   History Examination Decision-Making   LOW Complexity : Brief history review  LOW Complexity : 1-3 performance deficits relating to physical, cognitive , or psychosocial skils that result in activity limitations and / or participation restrictions  LOW Complexity : No comorbidities that affect functional and no verbal or physical assistance needed to complete eval tasks       Based on the above components, the patient evaluation is determined to be of the following complexity level: LOW   Pain Rating:  Patient reported no pain    Activity Tolerance:   VSS    After treatment patient left in no apparent distress:    Sitting in chair, alarm activated  RN notified  Call bell left within reach    COMMUNICATION/EDUCATION:   The patients plan of care was discussed with: Physical Therapist and Registered Nurse. Patient and/or family was verbally educated on the BE FAST acronym for signs/symptoms of CVA and TIA. Provided with BEFAST handout. All questions answered with patient indicating good understanding.      Thank you for this referral.  Brittny Morales OT  Time Calculation: 26 mins

## 2019-09-30 NOTE — PROGRESS NOTES
Problem: Patient Education: Go to Patient Education Activity  Goal: Patient/Family Education  Outcome: Progressing Towards Goal     Problem: TIA/CVA Stroke: 0-24 hours  Goal: Activity/Safety  Outcome: Progressing Towards Goal  Goal: Consults, if ordered  Outcome: Progressing Towards Goal  Goal: Diagnostic Test/Procedures  Outcome: Progressing Towards Goal  Goal: Nutrition/Diet  Outcome: Progressing Towards Goal  Goal: Discharge Planning  Outcome: Progressing Towards Goal  Goal: Medications  Outcome: Progressing Towards Goal  Goal: Respiratory  Outcome: Progressing Towards Goal  Goal: Treatments/Interventions/Procedures  Outcome: Progressing Towards Goal  Goal: Minimize risk of bleeding post-thrombolytic infusion  Outcome: Progressing Towards Goal  Goal: Monitor for complications post-thrombolytic infusion  Outcome: Progressing Towards Goal  Goal: Psychosocial  Outcome: Progressing Towards Goal  Goal: *Hemodynamically stable  Outcome: Progressing Towards Goal  Goal: *Neurologically stable  Description  Absence of additional neurological deficits    Outcome: Progressing Towards Goal  Goal: *Verbalizes anxiety and depression are reduced or absent  Outcome: Progressing Towards Goal  Goal: *Absence of Signs of Aspiration on Current Diet  Outcome: Progressing Towards Goal  Goal: *Absence of deep venous thrombosis signs and symptoms(Stroke Metric)  Outcome: Progressing Towards Goal  Goal: *Ability to perform ADLs and demonstrates progressive mobility and function  Outcome: Progressing Towards Goal  Goal: *Stroke education started(Stroke Metric)  Outcome: Progressing Towards Goal  Goal: *Dysphagia screen performed(Stroke Metric)  Outcome: Progressing Towards Goal  Goal: *Rehab consulted(Stroke Metric)  Outcome: Progressing Towards Goal     Problem: Falls - Risk of  Goal: *Absence of Falls  Description  Document Monika Fall Risk and appropriate interventions in the flowsheet.   Outcome: Progressing Towards Goal  Note: Fall Risk Interventions:                                Problem: Patient Education: Go to Patient Education Activity  Goal: Patient/Family Education  Outcome: Progressing Towards Goal     Problem: Pain  Goal: *Control of Pain  Outcome: Progressing Towards Goal

## 2019-09-30 NOTE — ROUTINE PROCESS
TRANSFER - OUT REPORT:    Verbal report given to 6S Rn(name) on Festus Rosales  being transferred to 6S(unit) for routine progression of care       Report consisted of patients Situation, Background, Assessment and   Recommendations(SBAR). Information from the following report(s) SBAR, ED Summary and MAR was reviewed with the receiving nurse. Lines:       Opportunity for questions and clarification was provided.       Patient transported with:   Registered Nurse

## 2019-09-30 NOTE — PROGRESS NOTES
Bedside and Verbal shift change report given to Radha Godoy, [facility] (oncoming nurse) by Telma Pedro RN (offgoing nurse). Report included the following information SBAR, Kardex, MAR, Recent Results and Cardiac Rhythm NSR.

## 2019-09-30 NOTE — PROGRESS NOTES
Stroke Education provided to patient and the following topics were discussed    1. Patients personal risk factors for stroke are hypertension    2. Warning signs of Stroke:        * Sudden numbness or weakness of the face, arm or leg, especially on one side of          The body            * Sudden confusion, trouble speaking or understanding        * Sudden trouble seeing in one or both eyes        * Sudden trouble walking, dizziness, loss of balance or coordination        * Sudden severe headache with no known cause      3. Importance of activation Emergency Medical Services ( 9-1-1 ) immediately if experience any warning signs of stroke. 4. Be sure and schedule a follow-up appointment with your primary care doctor or any specialists as instructed. 5. You must take medicine every day to treat your risk factors for stroke. Be sure to take your medicines exactly as your doctor tells you: no more, no less. Know what your medicines are for , what they do. Anti-thrombotics /anticoagulants can help prevent strokes. You are taking the following medicine(s)  Plavix - new medication but has taken Plavix in the past     6. Smoking and second-hand smoke greatly increase your risk of stroke, cardiovascular disease and death. Smoking history never    7. Information provided was Other Provided Patient [facility] brochure, magnet and Contol BP flyer. cjg    8. Documentation of teaching completed in Patient Education Activity and on Care Plan with teaching response noted?   yes

## 2019-09-30 NOTE — ED PROVIDER NOTES
74 y.o. female with past medical history significant for Lititz Palsy with right sided weakness, aortic stenosis, HTN, CAD, breast cancer, s/p pacemaker placement, s/p aortic valve replacement who presents via EMS to the ED with cc of vision loss. Patient states that she woke up this morning and felt mildly dizzy, which was not unusual for her. Patient states that at 1400 she went to the store where she fell and caught herself. Patient denies LOC after fall or hitting her head. Patient states that she then went home and sat down, when she had left visual field loss for 10 minutes, and patient decided to call EMS. Per EMS, LKW was 1400. Per chart, patient takes 81 mg ASA. Patient denies having previous similar episodes of vision loss. Patient denies facial numbness or left arm numbness. There are no other acute medical concerns at this time. Social Hx: no Tobacco use, occasional EtOH use, no Illicit Drug use  PCP: Jose Meyers MD  Cardiology: Eddie Saleem M.D. Note written by Aakash Britt, as dictated by Mere Santos MD 7:55 PM      The history is provided by the patient and the EMS personnel. No  was used.         Past Medical History:   Diagnosis Date    Aortic stenosis 10/27/2010    Aortic valve replaced 10/27/2010    Atrioventricular block, complete ([address] 10/27/2010    CAD (coronary artery disease)     Cancer ([facility] [address] 2004    h/o breast cancer    Depression     Essential hypertension     Fatigue 10/27/2010    HTN (hypertension) 10/27/2010    S/P cardiac pacemaker procedure 10/27/2010    Unspecified sleep apnea     uses CPAP       Past Surgical History:   Procedure Laterality Date    ABDOMEN SURGERY PROC UNLISTED      tummy [facility]    BREAST SURGERY PROCEDURE UNLISTED      mastectomy    HX HEART CATHETERIZATION      HX HEART VALVE SURGERY  3/2010    porcine AVR    HX HERNIA REPAIR      HX PACEMAKER  3/24/2010    [facility] [address], complete heart block    HX TONSILLECTOMY           No family history on file. Social History     Socioeconomic History    Marital status:      Spouse name: Not on file    Number of children: Not on file    Years of education: Not on file    Highest education level: Not on file   Occupational History    Not on file   Social Needs    Financial resource strain: Not on file    Food insecurity:     Worry: Not on file     Inability: Not on file    Transportation needs:     Medical: Not on file     Non-medical: Not on file   Tobacco Use    Smoking status: Never Smoker    Smokeless tobacco: Never Used   Substance and Sexual Activity    Alcohol use: Yes     Comment: social    Drug use: No    Sexual activity: Not on file   Lifestyle    Physical activity:     Days per week: Not on file     Minutes per session: Not on file    Stress: Not on file   Relationships    Social connections:     Talks on phone: Not on file     Gets together: Not on file     Attends Congregation service: Not on file     Active member of club or organization: Not on file     Attends meetings of clubs or organizations: Not on file     Relationship status: Not on file    Intimate partner violence:     Fear of current or ex partner: Not on file     Emotionally abused: Not on file     Physically abused: Not on file     Forced sexual activity: Not on file   Other Topics Concern    Not on file   Social History Narrative    Not on file         ALLERGIES: Ivp dye [fd and c blue no.1] and Tetracycline    Review of Systems   Constitutional: Negative for appetite change, chills and fever. HENT: Negative for rhinorrhea, sore throat and trouble swallowing. Eyes: Positive for visual disturbance (loss of left visual field). Negative for photophobia. Respiratory: Negative for cough and shortness of breath. Cardiovascular: Negative for chest pain and palpitations. Gastrointestinal: Negative for abdominal pain, nausea and vomiting.    Genitourinary: Negative for dysuria, frequency and hematuria. Musculoskeletal: Negative for arthralgias. Neurological: Positive for dizziness. Negative for syncope and weakness. Psychiatric/Behavioral: Negative for behavioral problems. The patient is not nervous/anxious. Vitals:    09/29/19 2048   BP: 187/85   Pulse: (!) 55   Resp: 14   Temp: 98.4 °F (36.9 °C)   SpO2: 100%            Physical Exam   Constitutional: She appears well-developed and well-nourished. HENT:   Head: Normocephalic and atraumatic. Mouth/Throat: Oropharynx is clear and moist.   Eyes: Pupils are equal, round, and reactive to light. EOM are normal.   Neck: Normal range of motion. Neck supple. Cardiovascular: Normal rate, regular rhythm, normal heart sounds and intact distal pulses. Exam reveals no gallop and no friction rub. No murmur heard. Pulmonary/Chest: Effort normal. No respiratory distress. She has no wheezes. She has no rales. Abdominal: Soft. There is no tenderness. There is no rebound. Musculoskeletal: Normal range of motion. She exhibits no tenderness. Neurological: She is alert. No cranial nerve deficit. Motor; symmetric, no pronator drift, equal  strength, finger nose intact, no aphasia, no slurred speech. Skin: No erythema. Psychiatric: She has a normal mood and affect. Her behavior is normal.   Nursing note and vitals reviewed. Note written by Aakash Amezquita, as dictated by Alana Hall MD 7:55 PM    MDM       Procedures    CONSULT NOTE:  8:10 PM Alana Hall MD spoke with Dr. Debby Xie for teleneurology. Discussed available diagnostic tests and clinical findings. Dr. Demetrius Victoria will evaluate patient. ED EKG interpretation:  Rhythm: AV paced rhythm; Rate (approx.): 55 bpm.  Note written by Aakash Amezquita, as dictated by Alana Hall MD 7:52 PM    PROGRESS NOTE:  9:50 PM  Patient states that she had two bovine aortic valve replacements.  Patient also states that she had a PFO closure due to VSD. Patient denies history of stroke and denies use of coumadin. She states that she was briefly taking plavix but currently only takes baby ASA. She states that she is seen by Dr. Vanda Guardado for cardiology at [facility]. Hospitalist Valerie for Admission  9:52 PM    ED Room Number: ER04/04  Patient Name and age:  [facility] 74 y.o.  female  Working Diagnosis:   1. Cerebrovascular accident (CVA), unspecified mechanism ([facility] [address]    2. TIA (transient ischemic attack)      Readmission: no  Isolation Requirements:  no  Recommended Level of Care:  telemetry  Code Status:  Full Code  Department:[facility] Adult ED - [phone]  Other:         Total critical care time spent exclusive of procedures:  34   minutes

## 2019-09-30 NOTE — CONSULTS
Consult for cardiology is noted per office  Dr Fredrick Issa has seen her   She had pacer done by me in the past but sees [facility]  I have not seen her since 2013  Will be on standby if EP service is needed

## 2019-09-30 NOTE — CONSULTS
INPATIENT NEUROLOGY CONSULTATION  9/30/2019     Consulted by: Winnie Trammell MD        Patient ID:  Xiomara Bender  [id number]  74 y.o.  1945    CC: Dizziness, fall, vision change    HPI    Mrs. Caron Jones is a 64-year-old woman with a history of cardiac valve disease, CABG, CAD, hypertension, etc. here because yesterday while shopping she suddenly felt vertiginous and had imbalance. She felt like she was drunk. She actually fell in the shopping mall. No double vision. She got in her car went home fine and while sitting in her recliner she began to notice the left vision was reduced like a sideways curtain somewhat white. No colors. She also felt her speech was not normal.  She had a dull frontal headache which is unusual.  She called her family and ultimately she was brought to the hospital.  CTA was completed final report being normal perfusion and normal blood vessels. She takes a baby aspirin daily for her cardiac condition. She is a history of Bell's palsy 15 years ago affecting the left side of her face. Also she tells me for the past 3 months she has been on losartan and she has had baseline persistent daily dizziness but yesterday symptoms were distinctly worse. A pacemaker and an MRI cannot be done. She is feeling back to her baseline today. Review of Systems   Constitutional: Positive for malaise/fatigue. Eyes: Negative for double vision. Musculoskeletal: Positive for falls. Neurological: Positive for dizziness, speech change and headaches. All other systems reviewed and are negative.       Past Medical History:   Diagnosis Date    Aortic stenosis 10/27/2010    Aortic valve replaced 10/27/2010    Atrioventricular block, complete ([facility] [address] 10/27/2010    CAD (coronary artery disease)     Cancer ([facility] [address] 2004    h/o breast cancer    Depression     Essential hypertension     Fatigue 10/27/2010    HTN (hypertension) 10/27/2010    S/P cardiac pacemaker procedure 10/27/2010    Unspecified sleep apnea     uses CPAP     History reviewed. No pertinent family history.   Social History     Socioeconomic History    Marital status:      Spouse name: Not on file    Number of children: Not on file    Years of education: Not on file    Highest education level: Not on file   Occupational History    Not on file   Social Needs    Financial resource strain: Not on file    Food insecurity:     Worry: Not on file     Inability: Not on file    Transportation needs:     Medical: Not on file     Non-medical: Not on file   Tobacco Use    Smoking status: Never Smoker    Smokeless tobacco: Never Used   Substance and Sexual Activity    Alcohol use: Yes     Comment: social    Drug use: No    Sexual activity: Not on file   Lifestyle    Physical activity:     Days per week: Not on file     Minutes per session: Not on file    Stress: Not on file   Relationships    Social connections:     Talks on phone: Not on file     Gets together: Not on file     Attends Anglican service: Not on file     Active member of club or organization: Not on file     Attends meetings of clubs or organizations: Not on file     Relationship status: Not on file    Intimate partner violence:     Fear of current or ex partner: Not on file     Emotionally abused: Not on file     Physically abused: Not on file     Forced sexual activity: Not on file   Other Topics Concern    Not on file   Social History Narrative    Not on file     Current Facility-Administered Medications   Medication Dose Route Frequency    labetalol (NORMODYNE;TRANDATE) injection 5 mg  5 mg IntraVENous Q10MIN PRN    aspirin chewable tablet 81 mg  81 mg Oral DAILY    atorvastatin (LIPITOR) tablet 10 mg  10 mg Oral DAILY    bumetanide (BUMEX) tablet 1 mg  1 mg Oral DAILY    dicyclomine (BENTYL) capsule 10 mg  10 mg Oral PRN    traZODone (DESYREL) tablet 150 mg  150 mg Oral QHS PRN    acetaminophen (TYLENOL) tablet 650 mg  650 mg Oral Q4H PRN Or    acetaminophen (TYLENOL) solution 650 mg  650 mg Per NG tube Q4H PRN    Or    acetaminophen (TYLENOL) suppository 650 mg  650 mg Rectal Q4H PRN    lactated Ringers infusion  100 mL/hr IntraVENous CONTINUOUS    ondansetron (ZOFRAN) injection 4 mg  4 mg IntraVENous Q6H PRN    bisacodyl (DULCOLAX) tablet 5 mg  5 mg Oral DAILY PRN    heparin (porcine) injection 5,000 Units  5,000 Units SubCUTAneous Q8H    hydrALAZINE (APRESOLINE) 20 mg/mL injection 10 mg  10 mg IntraVENous Q6H PRN     Allergies   Allergen Reactions    Ivp Dye [Fd And C Blue No.1] Not Reported This Time    Tetracycline Not Reported This Time       Visit Vitals  /62 (BP 1 Location: Left arm, BP Patient Position: Sitting)   Pulse (!) 55   Temp 97.9 °F (36.6 °C)   Resp 11   Wt 79.4 kg (175 lb)   SpO2 96%   BMI 28.68 kg/m²     Physical Exam   Constitutional: She is oriented to person, place, and time. She appears well-developed and well-nourished. Cardiovascular: Normal rate. Pulmonary/Chest: Effort normal.   Neurological: She is oriented to person, place, and time. Skin: Skin is warm and dry. Psychiatric: Her behavior is normal.   Vitals reviewed. Neurologic Exam     Mental Status   Oriented to person, place, and time. Very well spoken elderly woman sitting upright in chair giving me good details about her presentation.   Pupils are equal, EOMI without nystagmus, conjugate gaze, VF intact  Face is asymmetrical smile left side down  Tongue is midline speech is clear without aphasia  Motor testing 5/5   sensation intact throughout  Finger-to-nose intact bilaterally  Gait steady without ataxia                Lab Results   Component Value Date/Time    WBC 6.4 09/30/2019 01:51 AM    HGB 13.8 09/30/2019 01:51 AM    HCT 42.5 09/30/2019 01:51 AM    PLATELET 354 20/26/6493 01:51 AM    MCV 97.9 09/30/2019 01:51 AM     Lab Results   Component Value Date/Time    Hemoglobin A1c 5.9 09/30/2019 01:51 AM    Hemoglobin A1c 5.8 03/17/2010 11:41 AM    Glucose 206 (H) 09/30/2019 01:51 AM    Glucose (POC) 117 (H) 09/29/2019 07:58 PM    LDL, calculated 89 09/30/2019 01:51 AM    Creatinine 1.09 (H) 09/30/2019 01:51 AM      Lab Results   Component Value Date/Time    Cholesterol, total 167 09/30/2019 01:51 AM    HDL Cholesterol 68 09/30/2019 01:51 AM    LDL, calculated 89 09/30/2019 01:51 AM    Triglyceride 50 09/30/2019 01:51 AM    CHOL/HDL Ratio 2.5 09/30/2019 01:51 AM     Lab Results   Component Value Date/Time    ALT (SGPT) 36 09/30/2019 01:51 AM    AST (SGOT) 33 09/30/2019 01:51 AM    Alk. phosphatase 84 09/30/2019 01:51 AM    Bilirubin, total 0.6 09/30/2019 01:51 AM    Albumin 4.1 09/30/2019 01:51 AM    Protein, total 7.4 09/30/2019 01:51 AM    INR 1.0 06/10/2013 03:55 PM    Prothrombin time 10.4 06/10/2013 03:55 PM    PLATELET 637 69/78/6151 01:51 AM     Lab Results   Component Value Date/Time    INR 1.0 06/10/2013 03:55 PM    INR 1.2 (H) 04/06/2010 03:10 PM    INR 1.2 (H) 03/22/2010 03:15 PM    Prothrombin time 10.4 06/10/2013 03:55 PM    Prothrombin time 12.7 (H) 04/06/2010 03:10 PM    Prothrombin time 12.0 (H) 03/22/2010 03:15 PM         CT Results (maximum last 3): Results from [place] encounter on 09/29/19   CT CODE NEURO PERF W CBF    Narrative *PRELIMINARY REPORT*  *PRELIMINARY REPORT*    1. No acute arterial occlusion. No flow-limiting stenosis. 2. No perfusion abnormality. Preliminary report was provided by Dr. Butch Mann, the on-call radiologist, at 2045    Final report to follow. *END PRELIMINARY REPORT*      INDICATION: Right-sided weakness. Contrast-enhanced CT angiogram head and neck performed using 100 cc Isovue-370. Three-dimensional postprocessing was performed. CT perfusion analysis using computed tomography with contrast administration  including postprocessing of parametric maps with determination of cerebral blood  flow, cerebral blood volume, and mean transit time was also performed.     CT dose reduction was achieved through use of a standardized protocol tailored  for this examination and are automatic exposure control for dose modulation dose  reduction    NECK:    The origins of the great vessels are patent. Both vertebral arteries are patent. Both carotid arteries are patent. There is mild calcification at carotid  bifurcations but no significant stenosis using NASCET criteria   Dimension and is down here must be documented well whether or not that is still  documented on  Head:    Major intracranial vessels are patent. No large vessel occlusion or intracranial  aneurysm. No evidence for intracranial hemorrhage or acute stroke. The  underlying brain is unremarkable. Perfusion imaging demonstrates symmetric intracranial perfusion. .      Impression IMPRESSION: No acute abnormality. Negative CTA head and neck                       CTA CODE NEURO HEAD AND NECK W CONT    Narrative *PRELIMINARY REPORT*    *PRELIMINARY REPORT*    1. No acute arterial occlusion. No flow-limiting stenosis. 2. No perfusion abnormality. Preliminary report was provided by Dr. Aftab Lerma, the on-call radiologist, at 2045    Final report to follow. *END PRELIMINARY REPORT*      INDICATION: Right-sided weakness. Contrast-enhanced CT angiogram head and neck performed using 100 cc Isovue-370. Three-dimensional postprocessing was performed. CT perfusion analysis using computed tomography with contrast administration  including postprocessing of parametric maps with determination of cerebral blood  flow, cerebral blood volume, and mean transit time was also performed. CT dose reduction was achieved through use of a standardized protocol tailored  for this examination and are automatic exposure control for dose modulation dose  reduction    NECK:    The origins of the great vessels are patent. Both vertebral arteries are patent. Both carotid arteries are patent.  There is mild calcification at carotid  bifurcations but no significant stenosis using NASCET criteria   Dimension and is down here must be documented well whether or not that is still  documented on  Head:    Major intracranial vessels are patent. No large vessel occlusion or intracranial  aneurysm. No evidence for intracranial hemorrhage or acute stroke. The  underlying brain is unremarkable. Perfusion imaging demonstrates symmetric intracranial perfusion. .      Impression IMPRESSION: No acute abnormality. Negative CTA head and neck                               Assessment and Plan        75-year-old woman with multiple cardiac conditions, risk factors for stroke whom I suspect had a TIA yesterday potentially in the setting of low blood pressure as she took her blood pressure when she was symptomatic and she was approximately 111/50 which is low for her. I am going to add Plavix to her regimen given her significant cardiac history. Continue baby aspirin. Okay to relax her blood pressure parameters due to her cardiac status. [place] BPs 130-140's. She is going to discuss with her primary care doctor/cardiologist about replacing losartan as that has been causing baseline dizziness for several months. Maintain Lipitor as is as outpatient. LDL is below goal.  Complete stroke work-up with echo. Recommend telemetry 1 more night. If everything is fine tomorrow she can be discharged home. Note, she has left facial asymmetry due to a old Bell's palsy. Stroke education done. Any acute changes please activate code stroke. During this evaluation, we also discussed stroke education to include signs and symptoms of stroke and TIA. This clinical note was dictated with an electronic dictation software that can make unintentional errors. If there are any questions, please contact me directly for clarification.       2 [facility], DO  NEUROLOGIST  Diplomate YOLANDA  9/30/2019

## 2019-09-30 NOTE — ED TRIAGE NOTES
PT arrives from home d/t numbness on the left side. At 2 pm patient was shopping and fell, and started to feel numb. Pt also reports loss of vision in her LEFT eye that lasted 10 minutes. No vision symptoms at this time. B.S. Was 117.

## 2019-09-30 NOTE — H&P
[address]  HISTORY AND PHYSICAL    Name:  Hema Romero  MR#:  [id number]  :  1945  ACCOUNT #:  [de-identified]  ADMIT DATE:  2019      The patient was seen, evaluated, and admitted by me on 2019. PRIMARY CARE PHYSICIAN:  Leon Carlisle MD    SOURCE OF INFORMATION:  The patient and the patient's relatives were present at the bedside. CHIEF COMPLAINT:  Dizziness. HISTORY OF PRESENT ILLNESS:  This is a 75-year-old woman with a past medical history significant for hypertension; depression; status post pacemaker insertion secondary to heart block; aortic stenosis, status post aortic valve replacement with bioprosthetic; obstructive sleep apnea; breast cancer, status post mastectomy, was in her usual state of health until the day of her presentation in the emergency room when the patient developed dizziness. The patient woke up with the dizziness, stated that this is very unusual, described the dizziness as room spinning around her. She was also unsteady on her feet. She described the dizziness like the feeling that you will get when somebody is drunk, but the patient was able to carry out activity of daily living, went to the store and came back home, symptoms got worse. She then developed associated left visual field loss. The left visual field loss lasted about 10 minutes. EMS was called. The patient was brought to the emergency room for further evaluation. When the patient arrived at the emergency room, a CT scan of the head was obtained. This was negative. CTA of the head and neck did not show any significant lesion. The emergency room physician consulted neurologist through the Teleneurology Service. It was determined that the patient is not a tPA candidate. The symptoms improved, but the patient was still feeling dizzy, still unsteady on her feet. The patient was subsequently referred to the hospitalist service for evaluation for admission.   She has been admitted to this hospital in the past.  She was admitted in 03/2010 and underwent aortic valve replacement with bioprosthetic at the couple of admission after that related to complications of the aortic valve replacement. The patient denies fever, rigors, and chills. PAST MEDICAL HISTORY:  Hypertension, depression, status post pacemaker insertion, aortic valve replacement with bioprosthetic, obstructive sleep apnea, breast cancer. ALLERGIES:  THE PATIENT IS ALLERGIC TO TETRACYCLINE AND IV DYE. MEDICATIONS:  1. Aspirin 81 mg daily. 2.  Lipitor 10 mg daily. 3.  Zebeta 5 mg twice daily. 4.  Bumex 1 mg daily. 5.  Bentyl 10 mg as needed. 6.  Losartan 25 mg daily. 7.  Trazodone 50 mg daily at bedtime as needed. FAMILY HISTORY:  This was reviewed. Mother had hypertension. PAST SURGICAL HISTORY:  This is significant for mastectomy, aortic valve replacement with bioprosthetic, pacemaker insertion, tonsillectomy, hernia repair. SOCIAL HISTORY:  No history of alcohol or tobacco abuse. REVIEW OF SYSTEMS:  HEAD, EYES, EARS, NOSE, AND THROAT:  This is positive for dizziness. No headache, no blurring of vision, no photophobia. RESPIRATORY SYSTEM:  No cough, no shortness of breath, no hemoptysis. CARDIOVASCULAR SYSTEM:  No chest pain, no orthopnea, no palpitations. GASTROINTESTINAL SYSTEM:  No nausea or vomiting. No diarrhea, no constipation. GENITOURINARY SYSTEM:  No dysuria, no urgency, no frequency. All other systems are reviewed and they are negative. PHYSICAL EXAMINATION:  GENERAL APPEARANCE:  The patient appeared ill, in moderate distress. VITAL SIGNS:  On arrival at the emergency room, temperature 98.4, pulse rate 55, respiratory rate 14, blood pressure 187/85, oxygen saturation 100%. HEENT:  Head:  Normocephalic, atraumatic. Eyes:  Normal eye movement. No redness, no drainage, no discharge. Ears:  Normal external ears with no evidence of drainage.   Nose:  No deformity, no drainage. Mouth and Throat:  No visible oral lesion. NECK:  Neck is supple. No JVD, no thyromegaly. CHEST:  Clear breath sounds. No wheezing, no crackles. HEART:  Normal S1 and S2, regular. No clinically appreciable murmur. ABDOMEN:  Soft, nontender. Normal bowel sounds. CNS:  Alert and oriented x3. No gross focal neurological deficit. EXTREMITIES:  No edema. Pulses 2+ bilaterally. MUSCULOSKELETAL SYSTEM:  No obvious joint deformity or swelling. SKIN:  No active skin lesions seen in the exposed part of the body. PSYCHIATRY:  Normal mood and affect. LYMPHATIC SYSTEM:  No cervical lymphadenopathy. DIAGNOSTIC DATA:  EKG shows pacemaker rhythm. No significant ST and T-wave abnormalities. CT of the head without contrast shows no acute intracranial abnormalities. Findings suspicious for Chiari malformation noted. CTA of the head and neck, no acute pathology. LABORATORY DATA:  Hematology:  WBC 6.2, hemoglobin at 12.8, hematocrit 40.3, platelets 070. Chemistry:  Sodium 138, potassium 3.5, chloride 102, CO2 30, glucose 91, BUN 28, creatinine 0.98, calcium 8.9, total bilirubin 0.5, ALT 35, AST 31, alkaline phosphatase 81, total protein 7.0, albumin level 3.7, globulin at 3.3. Cardiac profile:  Troponin less than 0.05.    ASSESSMENT:  1. Suspected acute cerebrovascular accident. 2.  Hypertension. 3.  Depression. 4.  Status post pacemaker insertion secondary to heart block. 5.  Aortic stenosis, status post aortic valve replacement with bioprosthetic. 6.  Thrombocytopenia. 7.  Obstructive sleep apnea. PLAN:  1. Suspected acute CVA. We will admit the patient for further evaluation and treatment. We will obtain echocardiogram.  We will check lipid profile. We will check hemoglobin A1c level. We will check ESR and C-reactive protein level to evaluate the patient for systemic inflammation. We will check B12 level. The patient's pacemaker is not compatible with MRI.   Inpatient Neurology consult will be requested to assist in further evaluation and treatment. 2.  Hypertension. We will resume preadmission medication. We will monitor the patient's blood pressure closely. The patient will also be placed on hydralazine as needed for better blood pressure control. 3.  Depression. We will resume home medication. 4.  Status post pacemaker insertion secondary to heart block. We will continue to monitor. We will check TSH level. 5.  Aortic stenosis, status post aortic valve replacement with bioprosthetic. We will continue to monitor and await the results of echocardiogram.  6  Thrombocytopenia. This is mild. We will monitor the patient's platelet count. 7.  Obstructive sleep apnea. Continue with CPAP with home setting. OTHER ISSUES:  Code status, the patient is a full code. We will place the patient on heparin for DVT prophylaxis. If there is further drop in the patient's platelet count, we will discontinue heparin and request SCD for DVT prophylaxis. FUNCTIONAL STATUS PRIOR TO ADMISSION:  The patient is ambulatory without assistant or device.       Sharee Rodriguez MD      [id number]  D:  09/30/2019 4:42  T:  09/30/2019 5:59  JOB #:  [id number]  CC:  Ria Guerra MD

## 2019-09-30 NOTE — PROGRESS NOTES
Hospitalist Progress Note  Lloyd Shannon MD  Answering service: [phone] from in house phone        Date of Service:  2019  NAME:  Vijaya Forte  :  1945  MRN:  [id number]      Admission Summary: This is a 53-year-old woman with a past medical history significant for hypertension; depression; status post pacemaker insertion secondary to heart block; aortic stenosis, status post aortic valve replacement with bioprosthetic; obstructive sleep apnea; breast cancer, status post mastectomy, was in her usual state of health until the day of her presentation in the emergency room when the patient developed dizziness. The patient woke up with the dizziness, stated that this is very unusual, described the dizziness as room spinning around her. She was also unsteady on her feet. She described the dizziness like the feeling that you will get when somebody is drunk, but the patient was able to carry out activity of daily living, went to the store and came back home, symptoms got worse. She then developed associated left visual field loss. The left visual field loss lasted about 10 minutes. EMS was called. The patient was brought to the emergency room for further evaluation. When the patient arrived at the emergency room, a CT scan of the head was obtained. This was negative. CTA of the head and neck did not show any significant lesion. The emergency room physician consulted neurologist through the Teleneurology Service. It was determined that the patient is not a tPA candidate. The symptoms improved, but the patient was still feeling dizzy, still unsteady on her feet. The patient was subsequently referred to the hospitalist service for evaluation for admission.     Interval history / Subjective:     She said she feels better, slurred speech, left side weakness and vision back to normal  No left side chest pain or shortness of breath      Assessment & Plan:     Slurred speech, left side numbness, transient loss of vision the left eye possible due to TIA  -speech is normal, left side numbness and visual disturbance resolved  -continue on aspirin, lipitor and plavix is added  -CT head no acute abnormality   -CTA head and neck negative   -Echo 9/30 LV EF 56 - 60%, interatrial Septum: Agitated saline contrast study was performed. There was a right to left shunt in the baseline state. There was a right to left shunt, with provocative maneuvers to increase right atrial pressure. Aortic Valve: Prosthetic aortic valve. Mild aortic valve stenosis is present. Prosthesis is normal. Prosthetic valve function is sufficient  -may need GALINDO but patient wants to see her cardiologist at [facility] by neurologist     HTN  -hold losartan and atenolol,   -on prn labetalol, monitor BP    Hx of CAD  -no chest pain  -troponin <0.05 x 1  -on aspirin and lipitor     Hx of complete heart block with slow ventricular rate s/p pacemaker placement   -EKG AV dual paced rhythm vent rate 55 non specific st t wave    Hx of aortic stenosis s/p bioprosthetic aortic valve replacement  -stable  -echo pending    Thrombocytopenia  -resolved     Hx of breast cancer s/p mastectomy  -stable    Hx of depression   -stable    EPIFANIO  -continue CPAP        Code status: Full Code  DVT prophylaxis: heparin    Care Plan discussed with: Patient/Family and Nurse  Disposition: home with family     Hospital Problems  Date Reviewed: 9/29/2019          Codes Class Noted POA    * (Principal) Acute CVA (cerebrovascular accident) ([facility] [address] ICD-10-CM: I63.9  ICD-9-CM: 434.91  9/29/2019 Yes              Vital Signs:    Last 24hrs VS reviewed since prior progress note.  Most recent are:  Visit Vitals  /62 (BP 1 Location: Left arm, BP Patient Position: Sitting)   Pulse (!) 55   Temp 97.9 °F (36.6 °C)   Resp 11   Wt 79.4 kg (175 lb)   SpO2 96%   BMI 28.68 kg/m²       No intake or output data in the 24 hours ending 09/30/19 1248     Physical Examination:             Constitutional:  No acute distress, cooperative, pleasant    ENT:  Oral mucous moist, oropharynx benign. Resp:  CTA bilaterally. No wheezing/rhonchi/rales. No accessory muscle use   CV:  Regular rhythm, normal rate, no murmurs, gallops, rubs    GI:  Soft, non distended, non tender. normoactive bowel sounds, no hepatosplenomegaly     Musculoskeletal:  No edema    Neurologic:  Moves all extremities. AAOx3, CN II-XII reviewed     Skin:  Good turgor, no rashes or ulcers       Data Review:    Review and/or order of clinical lab test  Review and/or order of tests in the radiology section of CPT  Review and/or order of tests in the medicine section of CPT      Labs:     Recent Labs     09/30/19 0151 09/29/19 2047   WBC 6.4 6.2   HGB 13.8 12.8   HCT 42.5 40.3    148*     Recent Labs     09/30/19 0151 09/29/19 2047    138   K 3.8 3.5    102   CO2 28 30   BUN 28* 28*   CREA 1.09* 0.98   * 91   CA 9.6 8.9   MG 2.4  --    PHOS 3.0  --      Recent Labs     09/30/19 0151 09/29/19 2047   SGOT 33 31   ALT 36 35   AP 84 81   TBILI 0.6 0.5   TP 7.4 7.0   ALB 4.1 3.7   GLOB 3.3 3.3     No results for input(s): INR, PTP, APTT, INREXT, INREXT in the last 72 hours. No results for input(s): FE, TIBC, PSAT, FERR in the last 72 hours. No results found for: FOL, RBCF   No results for input(s): PH, PCO2, PO2 in the last 72 hours.   Recent Labs     09/30/19 0151 09/29/19 2047   TROIQ <0.05 <0.05     Lab Results   Component Value Date/Time    Cholesterol, total 167 09/30/2019 01:51 AM    HDL Cholesterol 68 09/30/2019 01:51 AM    LDL, calculated 89 09/30/2019 01:51 AM    Triglyceride 50 09/30/2019 01:51 AM    CHOL/HDL Ratio 2.5 09/30/2019 01:51 AM     Lab Results   Component Value Date/Time    Glucose (POC) 117 (H) 09/29/2019 07:58 PM    Glucose (POC) 132 (H) 03/26/2010 06:37 AM    Glucose (POC) 149 (H) 03/25/2010 10:03 PM    Glucose (POC) 176 (H) 03/25/2010 07:43 PM    Glucose (POC) 130 (H) 03/25/2010 01:20 PM     Lab Results   Component Value Date/Time    Color DARK YELLOW 04/01/2010 06:00 PM    Appearance CLOUDY 04/01/2010 06:00 PM    Specific gravity 1.027 04/01/2010 06:00 PM    pH (UA) 7.5 04/01/2010 06:00 PM    Protein 30 (A) 04/01/2010 06:00 PM    Glucose NEGATIVE  04/01/2010 06:00 PM    Ketone NEGATIVE  04/01/2010 06:00 PM    Bilirubin NEGATIVE  03/17/2010 11:41 AM    Urobilinogen 1.0 04/01/2010 06:00 PM    Nitrites NEGATIVE  04/01/2010 06:00 PM    Leukocyte Esterase NEGATIVE  04/01/2010 06:00 PM    Epithelial cells 10-20 04/01/2010 06:00 PM    Bacteria 1+ (A) 04/01/2010 06:00 PM    WBC 5-10 04/01/2010 06:00 PM    RBC 10-20 04/01/2010 06:00 PM         Medications Reviewed:     Current Facility-Administered Medications   Medication Dose Route Frequency    labetalol (NORMODYNE;TRANDATE) injection 5 mg  5 mg IntraVENous Q10MIN PRN    clopidogrel (PLAVIX) tablet 75 mg  75 mg Oral DAILY    aspirin chewable tablet 81 mg  81 mg Oral DAILY    atorvastatin (LIPITOR) tablet 10 mg  10 mg Oral DAILY    bumetanide (BUMEX) tablet 1 mg  1 mg Oral DAILY    dicyclomine (BENTYL) capsule 10 mg  10 mg Oral PRN    traZODone (DESYREL) tablet 150 mg  150 mg Oral QHS PRN    acetaminophen (TYLENOL) tablet 650 mg  650 mg Oral Q4H PRN    Or    acetaminophen (TYLENOL) solution 650 mg  650 mg Per NG tube Q4H PRN    Or    acetaminophen (TYLENOL) suppository 650 mg  650 mg Rectal Q4H PRN    lactated Ringers infusion  100 mL/hr IntraVENous CONTINUOUS    ondansetron (ZOFRAN) injection 4 mg  4 mg IntraVENous Q6H PRN    bisacodyl (DULCOLAX) tablet 5 mg  5 mg Oral DAILY PRN    heparin (porcine) injection 5,000 Units  5,000 Units SubCUTAneous Q8H    hydrALAZINE (APRESOLINE) 20 mg/mL injection 10 mg  10 mg IntraVENous Q6H PRN     ______________________________________________________________________  EXPECTED LENGTH OF STAY: - - -  ACTUAL LENGTH OF STAY:          1                 Raul Hunt MD

## 2019-10-01 VITALS
WEIGHT: 177.8 LBS | DIASTOLIC BLOOD PRESSURE: 60 MMHG | RESPIRATION RATE: 15 BRPM | BODY MASS INDEX: 29.14 KG/M2 | TEMPERATURE: 97.8 F | OXYGEN SATURATION: 92 % | HEART RATE: 55 BPM | SYSTOLIC BLOOD PRESSURE: 153 MMHG

## 2019-10-01 PROCEDURE — 74011250637 HC RX REV CODE- 250/637: Performed by: PSYCHIATRY & NEUROLOGY

## 2019-10-01 PROCEDURE — 74011250637 HC RX REV CODE- 250/637: Performed by: INTERNAL MEDICINE

## 2019-10-01 RX ORDER — ATORVASTATIN CALCIUM 40 MG/1
40 TABLET, FILM COATED ORAL DAILY
Qty: 30 TAB | Refills: 0 | Status: SHIPPED | OUTPATIENT
Start: 2019-10-01

## 2019-10-01 RX ORDER — CLOPIDOGREL BISULFATE 75 MG/1
75 TABLET ORAL DAILY
Qty: 30 TAB | Refills: 0 | Status: ON HOLD | OUTPATIENT
Start: 2019-10-02 | End: 2022-10-07 | Stop reason: SDUPTHER

## 2019-10-01 RX ADMIN — BUMETANIDE 1 MG: 1 TABLET ORAL at 08:01

## 2019-10-01 RX ADMIN — ASPIRIN 81 MG CHEWABLE TABLET 81 MG: 81 TABLET CHEWABLE at 08:01

## 2019-10-01 RX ADMIN — CLOPIDOGREL BISULFATE 75 MG: 75 TABLET ORAL at 08:01

## 2019-10-01 RX ADMIN — ATORVASTATIN CALCIUM 10 MG: 10 TABLET, FILM COATED ORAL at 08:01

## 2019-10-01 NOTE — PROGRESS NOTES
Bedside and Verbal shift change report given to Thanh Bernardo RN (oncoming nurse) by Quiana Cleaning RN (offgoing nurse).  Report included the following information SBAR, Kardex, ED Summary, Intake/Output, MAR, Recent Results and Cardiac Rhythm SR.

## 2019-10-01 NOTE — DISCHARGE INSTRUCTIONS
Discharge Instructions       PATIENT ID: Freddy Main  MRN: [id number]   YOB: 1945    DATE OF ADMISSION: 9/29/2019  7:59 PM    DATE OF DISCHARGE: 10/1/2019    PRIMARY CARE PROVIDER: Surekha Avila MD     ATTENDING PHYSICIAN: Latonya Ta MD  DISCHARGING PROVIDER: Myriam Gore MD    To contact this individual call [phone] and ask the  to page. If unavailable ask to be transferred the Adult Hospitalist Department. DISCHARGE DIAGNOSES     CONSULTATIONS: IP CONSULT TO NEUROLOGY    PROCEDURES/SURGERIES: * No surgery found *      FOLLOW UP APPOINTMENTS:   Follow-up Information     Follow up With Specialties Details Why Contact Info    Surekha Avila MD Family Practice In 1 week  [address] [facility] [address] [address] [facility]      Arun Lawler MD Cardiology In 1 week  [facility] [address]  [address] [facility] [address]  [phone]      [address] Cardiology  In 1 week  [facility] [address] [facility] Faiza Wells, DO Neurology In 1 week  [address] [address]  [phone]             ADDITIONAL CARE RECOMMENDATIONS:   Follow up with cardiology at [address] [place] [address], PCP and neurology  You need to discuss with neurology regarding continuation of plavix. Check your BP on regular basis  In case of dizziness or any new or worsening symptoms come to the ED     DIET: Cardiac Diet    ACTIVITY: Activity as tolerated        DISCHARGE MEDICATIONS:   See Medication Reconciliation Form    · It is important that you take the medication exactly as they are prescribed. · Keep your medication in the bottles provided by the pharmacist and keep a list of the medication names, dosages, and times to be taken in your wallet. · Do not take other medications without consulting your doctor. NOTIFY YOUR PHYSICIAN FOR ANY OF THE FOLLOWING:   Fever over 101 degrees for 24 hours.    Chest pain, shortness of breath, fever, chills, nausea, vomiting, diarrhea, change in mentation, falling, weakness, bleeding. Severe pain or pain not relieved by medications. Or, any other signs or symptoms that you may have questions about. DISPOSITION:    Home With:   OT  PT  HH  RN       SNF/Inpatient Rehab/LTAC    Independent/assisted living    Hospice    Other:     CDMP Checked:   Yes ***     PROBLEM LIST Updated:  Yes ***       Information obtained by :   I understand that if any problems occur once I am at home I am to contact my physician. I understand and acknowledge receipt of the instructions indicated above.                                                                                                                                              Physician's or R.N.'s Signature                                                                  Date/Time                                                                                                                                              Patient or Representative Signature                                                          Date/Time          Signed:   Clara Barnhart MD  10/1/2019  11:18 AM

## 2019-10-01 NOTE — DISCHARGE SUMMARY
Inpatient hospitalist discharge summary                Brief Overview    PATIENT ID: Brianna Campos    MRN: [id number]     YOB: 1945    Admitting Provider: Fede Mcnair MD    Discharging Provider: Kylah Aponte MD   To contact this individual call [phone] and ask the  to page. If unavailable ask to be transferred the Adult Hospitalist Department. 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  The patient woke up with the dizziness, stated that this is very unusual, described the dizziness as room spinning around her. Rommel Lawrence was also unsteady on her feet.  She described the dizziness like the feeling that you will get when somebody is drunk, but the patient was able to carry out activity of daily living, went to the store and came back home, symptoms got worse.  She then developed associated left visual field loss.  The left visual field loss lasted about 10 minutes.  EMS was called.  The patient was brought to the emergency room for further evaluation.  When the patient arrived at the emergency room, a CT scan of the head was obtained. This was negative.  CTA of the head and neck did not show any significant lesion.  The emergency room physician consulted neurologist through the Teleneu[facility] Service. Migdalia Craven was determined that the patient is not a tPA candidate.  The symptoms improved, but the patient was still feeling dizzy, still unsteady on her feet.  The patient was subsequently referred to the hospitalist service for evaluation for admission. Slurred speech, left side numbness, transient loss of vision the left eye possible due to TIA, resolved   -speech is normal, left side numbness and visual disturbance resolved  -continue on aspirin, lipitor and plavix is added by neurology   -CT head no acute abnormality   -CTA head and neck negative   -Echo 9/30 LV EF 56 - 60%, interatrial Septum: Agitated saline contrast study was performed. There was a right to left shunt in the baseline state. There was a right to left shunt, with provocative maneuvers to increase right atrial pressure. Aortic Valve: Prosthetic aortic valve. Mild aortic valve stenosis is present.  Prosthesis is normal. Prosthetic valve function is sufficient  -may need GALINDO but patient wants to see her cardiologist at [facility] by neurologist   Advised patient to follow up with neurology and cardiology at [facility]  If recurrent TIA then will need GALINDO as per cardiology   Patient will make appointments      HTN  Will resume home BP medications as per cardiology recommendations      Hx of CAD  -no chest pain  -troponin <0.05 x 1  -on aspirin and lipitor and plavix as well     Hx of complete heart block with slow ventricular rate s/p pacemaker placement   -EKG AV dual paced rhythm vent rate 55 non specific st t wave     Hx of aortic stenosis s/p bioprosthetic aortic valve replacement  -stable  -patient will follow up with [facility] cardiology as an outpatient    Thrombocytopenia  -resolved      Hx of breast cancer s/p mastectomy  -stable     Hx of depression   -stable     EPIFANIO  -continue CPAP          On the date of discharge, diagnostic face to face encounter was performed. Patient was hemodynamically stable, offering no new complaints. Denies any shortness of breath at rest, no fevers or chills, no diarrhea or constipation. Patient is agreeable for discharge. Patient understood and verbalized the understanding of the discharge plan. Patient was advised to seek medical help/ care or return to ED, if symptoms recur, worsen or new symptoms develop. Discharge Disposition:  Home or Self Care    Discharge activity:  Activity as tolerated    Code status at discharge:  Full Code       Outpatient follow up:  Need to follow up with cardiology, PCP and neurology      Future appointments-  No future appointments. Follow-up Information     Follow up With Specialties Details Why Contact Info    Robin Anderson MD [facility] In [facility] week  [address] [address] [facility]      Melba Jesus MD Cardiology In 1 week  [address]  [address] [facility] [place] [address]  [phone]      [address] Cardiology  In 1 week  [facility] [address] [id number]  [address], [address], DO Neurology In 1 week  [address] [facility]  [address]  [address]  [phone]              Operative procedures performed:      Treatments: cardiac meds: Plavix added    Consults: Cardiology IP CONSULT TO NEUROLOGY    Procedures:   * No surgery found *    Diet:  DIET REGULAR  DIET ONE TIME MESSAGE    Pertinent test results:  Cta Code Neuro Head And Neck W Cont    Result Date: 9/30/2019  *PRELIMINARY REPORT* *PRELIMINARY REPORT* 1. No acute arterial occlusion. No flow-limiting stenosis. 2. No perfusion abnormality. Preliminary report was provided by Dr. Alyce Oleary, the on-call radiologist, at 2045 Final report to follow.  *END PRELIMINARY REPORT* INDICATION: Right-sided weakness. Contrast-enhanced CT angiogram head and neck performed using 100 cc Isovue-370. Three-dimensional postprocessing was performed. CT perfusion analysis using computed tomography with contrast administration including postprocessing of parametric maps with determination of cerebral blood flow, cerebral blood volume, and mean transit time was also performed. CT dose reduction was achieved through use of a standardized protocol tailored for this examination and are automatic exposure control for dose modulation dose reduction NECK: The origins of the great vessels are patent. Both vertebral arteries are patent. Both carotid arteries are patent. There is mild calcification at carotid bifurcations but no significant stenosis using NASCET criteria Dimension and is down here must be documented well whether or not that is still documented on Head: Major intracranial vessels are patent. No large vessel occlusion or intracranial aneurysm. No evidence for intracranial hemorrhage or acute stroke. The underlying brain is unremarkable. Perfusion imaging demonstrates symmetric intracranial perfusion. .     IMPRESSION: No acute abnormality. Negative CTA head and neck     Ct Code Neuro Head Wo Contrast    Result Date: 9/29/2019  INDICATION: slurred speech/listing to left EXAM:  HEAD CT WITHOUT CONTRAST COMPARISON: None TECHNIQUE:  Routine noncontrast axial head CT was performed. Sagittal and coronal reconstructions were generated. CT dose reduction was achieved through use of a standardized protocol tailored for this examination and automatic exposure control for dose modulation. FINDINGS: Ventricles: Midline, no hydrocephalus. Intracranial Hemorrhage: None. Brain Parenchyma/Brainstem: Normal for age. Basal Cisterns: Normal. Paranasal Sinuses: Visualized sinuses are clear. Additional Comments: Cerebellar tonsils reject 8 mm below the foramen magnum with slight CSF crowding. IMPRESSION: 1.  No acute intracranial abnormality. 2. 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 Ct Code Neuro Perf W Cbf    Result Date: 9/30/2019  *PRELIMINARY REPORT* *PRELIMINARY REPORT* 1. No acute arterial occlusion. No flow-limiting stenosis. 2. No perfusion abnormality. Preliminary report was provided by Dr. Jose Juan Bowie, the on-call radiologist, at 2045 Final report to follow. *END PRELIMINARY REPORT* INDICATION: Right-sided weakness. Contrast-enhanced CT angiogram head and neck performed using 100 cc Isovue-370. Three-dimensional postprocessing was performed. CT perfusion analysis using computed tomography with contrast administration including postprocessing of parametric maps with determination of cerebral blood flow, cerebral blood volume, and mean transit time was also performed. CT dose reduction was achieved through use of a standardized protocol tailored for this examination and are automatic exposure control for dose modulation dose reduction NECK: The origins of the great vessels are patent. Both vertebral arteries are patent. Both carotid arteries are patent. There is mild calcification at carotid bifurcations but no significant stenosis using NASCET criteria Dimension and is down here must be documented well whether or not that is still documented on Head: Major intracranial vessels are patent. No large vessel occlusion or intracranial aneurysm. No evidence for intracranial hemorrhage or acute stroke. The underlying brain is unremarkable. Perfusion imaging demonstrates symmetric intracranial perfusion. .     IMPRESSION: No acute abnormality.  Negative CTA head and neck       Recent Results (from the past 336 hour(s))   GLUCOSE, POC    Collection Time: 09/29/19  7:58 PM   Result Value Ref Range    Glucose (POC) 117 (H) 65 - 100 mg/dL    Performed by ELLIOTT AMI    CBC WITH AUTOMATED DIFF    Collection Time: 09/29/19  8:47 PM   Result Value Ref Range    WBC 6.2 3.6 - 11.0 K/uL    RBC 4.00 3.80 - 5.20 M/uL    HGB 12.8 11.5 - 16.0 g/dL    HCT 40.3 35.0 - 47.0 %    .8 (H) 80.0 - 99.0 FL    MCH 32.0 26.0 - 34.0 PG    MCHC 31.8 30.0 - 36.5 g/dL    RDW 13.2 11.5 - 14.5 %    PLATELET 124 (L) 145 - 400 K/uL    MPV 11.2 8.9 - 12.9 FL    NRBC 0.0 0  WBC    ABSOLUTE NRBC 0.00 0.00 - 0.01 K/uL    NEUTROPHILS 53 32 - 75 %    LYMPHOCYTES 29 12 - 49 %    MONOCYTES 12 5 - 13 %    EOSINOPHILS 4 0 - 7 %    BASOPHILS 1 0 - 1 %    IMMATURE GRANULOCYTES 1 (H) 0.0 - 0.5 %    ABS. NEUTROPHILS 3.3 1.8 - 8.0 K/UL    ABS. LYMPHOCYTES 1.8 0.8 - 3.5 K/UL    ABS. MONOCYTES 0.7 0.0 - 1.0 K/UL    ABS. EOSINOPHILS 0.3 0.0 - 0.4 K/UL    ABS. BASOPHILS 0.1 0.0 - 0.1 K/UL    ABS. IMM. GRANS. 0.0 0.00 - 0.04 K/UL    DF AUTOMATED     METABOLIC PANEL, COMPREHENSIVE    Collection Time: 09/29/19  8:47 PM   Result Value Ref Range    Sodium 138 136 - 145 mmol/L    Potassium 3.5 3.5 - 5.1 mmol/L    Chloride 102 97 - 108 mmol/L    CO2 30 21 - 32 mmol/L    Anion gap 6 5 - 15 mmol/L    Glucose 91 65 - 100 mg/dL    BUN 28 (H) 6 - 20 MG/DL    Creatinine 0.98 0.55 - 1.02 MG/DL    BUN/Creatinine ratio 29 (H) 12 - 20      GFR est AA >60 >60 ml/min/1.73m2    GFR est non-AA 55 (L) >60 ml/min/1.73m2    Calcium 8.9 8.5 - 10.1 MG/DL    Bilirubin, total 0.5 0.2 - 1.0 MG/DL    ALT (SGPT) 35 12 - 78 U/L    AST (SGOT) 31 15 - 37 U/L    Alk. phosphatase 81 45 - 117 U/L    Protein, total 7.0 6.4 - 8.2 g/dL    Albumin 3.7 3.5 - 5.0 g/dL    Globulin 3.3 2.0 - 4.0 g/dL    A-G Ratio 1.1 1.1 - 2.2     SAMPLES BEING HELD    Collection Time: 09/29/19  8:47 PM   Result Value Ref Range    SAMPLES BEING HELD 1RED,1BLU,1UA     COMMENT        Add-on orders for these samples will be processed based on acceptable specimen integrity and analyte stability, which may vary by analyte.    TROPONIN I    Collection Time: 09/29/19  8:47 PM   Result Value Ref Range    Troponin-I, Qt. <0.05 <0.05 ng/mL   EKG, 12 LEAD, INITIAL Collection Time: 09/29/19  8:52 PM   Result Value Ref Range    Ventricular Rate 55 BPM    Atrial Rate 55 BPM    P-R Interval 208 ms    QRS Duration 178 ms    Q-T Interval 526 ms    QTC Calculation (Bezet) 503 ms    Calculated P Axis 54 degrees    Calculated R Axis -83 degrees    Calculated T Axis 81 degrees    Diagnosis       AV dual-paced rhythm  When compared with ECG of 18-OCT-2012 10:03,  Vent. rate has decreased BY   5 BPM  Confirmed by Charla Matthews M.D., Mark Richards ([id number]) on 9/30/2019 8:20:30 AM     LIPID PANEL    Collection Time: 09/30/19  1:51 AM   Result Value Ref Range    LIPID PROFILE          Cholesterol, total 167 <200 MG/DL    Triglyceride 50 <150 MG/DL    HDL Cholesterol 68 MG/DL    LDL, calculated 89 0 - 100 MG/DL    VLDL, calculated 10 MG/DL    CHOL/HDL Ratio 2.5 0.0 - 5.0     HEMOGLOBIN A1C WITH EAG    Collection Time: 09/30/19  1:51 AM   Result Value Ref Range    Hemoglobin A1c 5.9 4.2 - 6.3 %    Est. average glucose 123 mg/dL   CBC WITH AUTOMATED DIFF    Collection Time: 09/30/19  1:51 AM   Result Value Ref Range    WBC 6.4 3.6 - 11.0 K/uL    RBC 4.34 3.80 - 5.20 M/uL    HGB 13.8 11.5 - 16.0 g/dL    HCT 42.5 35.0 - 47.0 %    MCV 97.9 80.0 - 99.0 FL    MCH 31.8 26.0 - 34.0 PG    MCHC 32.5 30.0 - 36.5 g/dL    RDW 12.9 11.5 - 14.5 %    PLATELET 239 114 - 852 K/uL    MPV 10.9 8.9 - 12.9 FL    NRBC 0.0 0  WBC    ABSOLUTE NRBC 0.00 0.00 - 0.01 K/uL    NEUTROPHILS 89 (H) 32 - 75 %    LYMPHOCYTES 7 (L) 12 - 49 %    MONOCYTES 3 (L) 5 - 13 %    EOSINOPHILS 0 0 - 7 %    BASOPHILS 1 0 - 1 %    IMMATURE GRANULOCYTES 0 %    ABS. NEUTROPHILS 5.7 1.8 - 8.0 K/UL    ABS. LYMPHOCYTES 0.4 (L) 0.8 - 3.5 K/UL    ABS. MONOCYTES 0.2 0.0 - 1.0 K/UL    ABS. EOSINOPHILS 0.0 0.0 - 0.4 K/UL    ABS. BASOPHILS 0.1 0.0 - 0.1 K/UL    ABS. IMM.  GRANS. 0.0 K/UL    DF MANUAL      RBC COMMENTS NORMOCYTIC, NORMOCHROMIC     METABOLIC PANEL, COMPREHENSIVE    Collection Time: 09/30/19  1:51 AM   Result Value Ref Range    Sodium 138 136 - 145 mmol/L    Potassium 3.8 3.5 - 5.1 mmol/L    Chloride 104 97 - 108 mmol/L    CO2 28 21 - 32 mmol/L    Anion gap 6 5 - 15 mmol/L    Glucose 206 (H) 65 - 100 mg/dL    BUN 28 (H) 6 - 20 MG/DL    Creatinine 1.09 (H) 0.55 - 1.02 MG/DL    BUN/Creatinine ratio 26 (H) 12 - 20      GFR est AA 59 (L) >60 ml/min/1.73m2    GFR est non-AA 49 (L) >60 ml/min/1.73m2    Calcium 9.6 8.5 - 10.1 MG/DL    Bilirubin, total 0.6 0.2 - 1.0 MG/DL    ALT (SGPT) 36 12 - 78 U/L    AST (SGOT) 33 15 - 37 U/L    Alk.  phosphatase 84 45 - 117 U/L    Protein, total 7.4 6.4 - 8.2 g/dL    Albumin 4.1 3.5 - 5.0 g/dL    Globulin 3.3 2.0 - 4.0 g/dL    A-G Ratio 1.2 1.1 - 2.2     TSH 3RD GENERATION    Collection Time: 09/30/19  1:51 AM   Result Value Ref Range    TSH 0.61 0.36 - 3.74 uIU/mL   MAGNESIUM    Collection Time: 09/30/19  1:51 AM   Result Value Ref Range    Magnesium 2.4 1.6 - 2.4 mg/dL   PHOSPHORUS    Collection Time: 09/30/19  1:51 AM   Result Value Ref Range    Phosphorus 3.0 2.6 - 4.7 MG/DL   C REACTIVE PROTEIN, QT    Collection Time: 09/30/19  1:51 AM   Result Value Ref Range    C-Reactive protein <0.29 0.00 - 0.60 mg/dL   SED RATE (ESR)    Collection Time: 09/30/19  1:51 AM   Result Value Ref Range    Sed rate, automated 9 0 - 30 mm/hr   VITAMIN B12    Collection Time: 09/30/19  1:51 AM   Result Value Ref Range    Vitamin B12 452 193 - 986 pg/mL   TROPONIN I    Collection Time: 09/30/19  1:51 AM   Result Value Ref Range    Troponin-I, Qt. <0.05 <0.05 ng/mL   ECHO ADULT COMPLETE    Collection Time: 09/30/19  9:20 AM   Result Value Ref Range    LA Volume 104.26 22 - 52 mL    LV E' Lateral Velocity 7.98 cm/s    LV E' Septal Velocity 5.94 cm/s    Tapse 2.70 (A) 1.5 - 2.0 cm    Ao Root D 3.76 cm    Aortic Valve Systolic Peak Velocity 995.68 cm/s    AoV VTI 59.38 cm    Aortic Valve Area by Continuity of Peak Velocity 2.1 cm2    Aortic Valve Area by Continuity of VTI 2.3 cm2    AoV PG 27.1 mmHg    LVIDd 5.17 3.9 - 5.3 cm    LVPWd 1.04 (A) 0.6 - 0.9 cm    LVIDs 3.50 cm    IVSd 1.16 (A) 0.6 - 0.9 cm    LVOT d 1.90 cm    LVOT Peak Velocity 192.29 cm/s    LVOT Peak Gradient 14.8 mmHg    LVOT VTI 47.22 cm    MVA (PHT) 2.0 cm2    MV A Jose D 99.50 cm/s    MV E Jose D 84.72 cm/s    MV E/A 0.85     Left Atrium to Aortic Root Ratio 1.23     RVIDd 5.87 cm    Aortic Valve Systolic Mean Gradient 25.1 mmHg    LA Vol 4C 87.16 (A) 22 - 52 mL    LA Vol 2C 106.27 (A) 22 - 52 mL    LA Area 4C 28.8 cm2    LV Mass .0 (A) 67 - 162 g    E/E' lateral 10.62     E/E' septal 14.26     RVSP 70.2 mmHg    E/E' ratio (averaged) 12.44     Est. RA Pressure 10.0 mmHg    Mitral Regurgitant Peak Velocity 641.13 cm/s    Mitral Valve E Wave Deceleration Time 378.1 ms    Mitral Valve Pressure Half-time 109.6 ms    Left Atrium Major Axis 4.62 cm    Triscuspid Valve Regurgitation Peak Gradient 60.2 mmHg    Pulmonic Valve Max Velocity 119.14 cm/s    LVOT .8 ml    TR Max Velocity 387.89 cm/s    PASP 70.2 mmHg    MR Peak Gradient 164.4 mmHg    PV peak gradient 5.7 mmHg           Physical Exam on Discharge:    Discharge condition: good    Vital signs:   Patient Vitals for the past 12 hrs:   Temp Pulse Resp BP SpO2   10/01/19 1004 97.8 °F (36.6 °C) (!) 55 15 153/60 92 %   10/01/19 0801 -- (!) 55 -- 152/70 --   10/01/19 0551 98 °F (36.7 °C) (!) 55 12 130/50 92 %   10/01/19 0157 97.9 °F (36.6 °C) (!) 56 17 138/50 96 %       Visit Vitals  /60 (BP 1 Location: Left arm, BP Patient Position: Sitting)   Pulse (!) 55   Temp 97.8 °F (36.6 °C)   Resp 15   Wt 80.6 kg (177 lb 12.8 oz)   SpO2 92%   BMI 29.14 kg/m²     General:  Alert, cooperative, no distress, appears stated age. Head:  Normocephalic, without obvious abnormality, atraumatic. Lungs:   Clear to auscultation bilaterally. Chest wall:  No tenderness or deformity. Heart:  , S1, S2 normal, no murmur, click, rub or gallop. Abdomen:   Soft, non-tender. Bowel sounds normal. No masses,  No organomegaly.    Extremities: Extremities normal, atraumatic, no cyanosis or edema. Pulses: 2+ and symmetric all extremities. Skin: Skin color, texture, turgor normal. No rashes or lesions. Neurologic: CNII-XII intact. Normal strength, sensation and reflexes throughout.        Current Discharge Medication List            Total time spent on discharge planning, counseling and co-ordination of care:   35 minutes    Sarah Singh MD  10/01/19  11:22 AM

## 2019-10-01 NOTE — PROGRESS NOTES
Problem: TIA/CVA Stroke: Day 2 Until Discharge  Goal: Activity/Safety  Outcome: Progressing Towards Goal  Goal: Diagnostic Test/Procedures  Outcome: Progressing Towards Goal  Goal: Nutrition/Diet  Outcome: Progressing Towards Goal  Goal: Discharge Planning  Outcome: Progressing Towards Goal  Goal: Medications  Outcome: Progressing Towards Goal  Goal: Respiratory  Outcome: Progressing Towards Goal  Goal: Treatments/Interventions/Procedures  Outcome: Progressing Towards Goal  Goal: Psychosocial  Outcome: Progressing Towards Goal  Goal: *Verbalizes anxiety and depression are reduced or absent  Outcome: Progressing Towards Goal  Goal: *Absence of aspiration  Outcome: Progressing Towards Goal  Goal: *Absence of deep venous thrombosis signs and symptoms(Stroke Metric)  Outcome: Progressing Towards Goal  Goal: *Optimal pain control at patient's stated goal  Outcome: Progressing Towards Goal  Goal: *Tolerating diet  Outcome: Progressing Towards Goal  Goal: *Ability to perform ADLs and demonstrates progressive mobility and function  Outcome: Progressing Towards Goal  Goal: *Stroke education continued(Stroke Metric)  Outcome: Progressing Towards Goal     Problem: Ischemic Stroke: Discharge Outcomes  Goal: *Verbalizes anxiety and depression are reduced or absent  Outcome: Progressing Towards Goal  Goal: *Verbalize understanding of risk factor modification(Stroke Metric)  Outcome: Progressing Towards Goal  Goal: *Hemodynamically stable  Outcome: Progressing Towards Goal  Goal: *Absence of aspiration pneumonia  Outcome: Progressing Towards Goal  Goal: *Aware of needed dietary changes  Outcome: Progressing Towards Goal  Goal: *Verbalize understanding of prescribed medications including anti-coagulants, anti-lipid, and/or anti-platelets(Stroke Metric)  Outcome: Progressing Towards Goal  Goal: *Tolerating diet  Outcome: Progressing Towards Goal  Goal: *Aware of follow-up diagnostics related to anticoagulants  Outcome: Progressing Towards Goal  Goal: *Ability to perform ADLs and demonstrates progressive mobility and function  Outcome: Progressing Towards Goal  Goal: *Absence of DVT(Stroke Metric)  Outcome: Progressing Towards Goal  Goal: *Absence of aspiration  Outcome: Progressing Towards Goal  Goal: *Optimal pain control at patient's stated goal  Outcome: Progressing Towards Goal  Goal: *Home safety concerns addressed  Outcome: Progressing Towards Goal  Goal: *Describes available resources and support systems  Outcome: Progressing Towards Goal  Goal: *Verbalizes understanding of activation of EMS(911) for stroke symptoms(Stroke Metric)  Outcome: Progressing Towards Goal  Goal: *Understands and describes signs and symptoms to report to providers(Stroke Metric)  Outcome: Progressing Towards Goal  Goal: *Neurolgocially stable (absence of additional neurological deficits)  Outcome: Progressing Towards Goal  Goal: *Verbalizes importance of follow-up with primary care physician(Stroke Metric)  Outcome: Progressing Towards Goal  Goal: *Smoking cessation discussed,if applicable(Stroke Metric)  Outcome: Progressing Towards Goal  Goal: *Depression screening completed(Stroke Metric)  Outcome: Progressing Towards Goal     Problem: Falls - Risk of  Goal: *Absence of Falls  Description  Document Monika Fall Risk and appropriate interventions in the flowsheet.   Outcome: Progressing Towards Goal  Note:   Fall Risk Interventions:            Medication Interventions: Patient to call before getting OOB, Teach patient to arise slowly                   Problem: Pain  Goal: *Control of Pain  Outcome: Progressing Towards Goal

## 2019-10-01 NOTE — PROGRESS NOTES
Hospital follow-up PCP transitional care appointment has been scheduled with Dr. Odalys Jade for Monday, 10/7/19 at 3:45 p.m. Pending patient discharge.   Daniela Boykin, Care Management Specialist.

## 2019-10-01 NOTE — PROGRESS NOTES
Stroke Education documented in Patient Education: YES  Core Measures Documented in [facility]:  Risk Factors: YES  Warning signs of stroke: YES  When to Activate 911: YES  Medication Education for Risk Factors: YES  Smoking cessation if applicable: YES  Written Education Given:  YES    Discharge NIH Completed: YES  Score: 0    BRAINS: YES    Follow Up Appointment Made: NO  Date/Time if applicable: Patient to f/u with neurologist and cardiologist, verbalizes understanding and will call when she gets home. Bedside RN performed patient education and medication education. Discharge concerns initiated and discussed with patient, including clarification on \"who\" assists the patient at their home and instructions for when the home going patient should call their provider after discharge. Opportunity for questions and clarification was provided. Patient receptive to education: YES  Patient stated: \"My granddaughter's coming to get me. \"  Barriers to Education: None. Diagnosis Education given:  YES    Length of stay: 2  Expected Day of Discharge: 10/1/19  Ask if they have \"Help at Home\" & add to white board? YES    Education Day #: 2    Medication Education Given:  YES  M in the box Medication name: Plavix    Pt aware of HCAHPS survey: YES    If you experience chest pain call 911. Do not drive yourself. I have reviewed discharge instructions with the patient. The patient verbalized understanding.

## 2019-10-01 NOTE — PROGRESS NOTES
Problem: TIA/CVA Stroke: Day 2 Until Discharge  Goal: Activity/Safety  Outcome: Progressing Towards Goal  Goal: Medications  Outcome: Progressing Towards Goal  Goal: *Tolerating diet  Outcome: Progressing Towards Goal     Problem: Ischemic Stroke: Discharge Outcomes  Goal: *Verbalize understanding of risk factor modification(Stroke Metric)  Outcome: Progressing Towards Goal  Goal: *Hemodynamically stable  Outcome: Progressing Towards Goal  Goal: *Aware of needed dietary changes  Outcome: Progressing Towards Goal  Goal: *Tolerating diet  Outcome: Progressing Towards Goal     Problem: Falls - Risk of  Goal: *Absence of Falls  Description  Document Monika Fall Risk and appropriate interventions in the flowsheet. Outcome: Progressing Towards Goal  Note:   Fall Risk Interventions:            Medication Interventions: Patient to call before getting OOB, Teach patient to arise slowly                   Problem: Pain  Goal: *Control of Pain  Outcome: Progressing Towards Goal     Problem: TIA/CVA Stroke: Day 2 Until Discharge  Goal: Activity/Safety  Outcome: Progressing Towards Goal  Goal: Medications  Outcome: Progressing Towards Goal  Goal: *Tolerating diet  Outcome: Progressing Towards Goal     Problem: Ischemic Stroke: Discharge Outcomes  Goal: *Verbalize understanding of risk factor modification(Stroke Metric)  Outcome: Progressing Towards Goal  Goal: *Hemodynamically stable  Outcome: Progressing Towards Goal  Goal: *Aware of needed dietary changes  Outcome: Progressing Towards Goal  Goal: *Tolerating diet  Outcome: Progressing Towards Goal     Problem: Falls - Risk of  Goal: *Absence of Falls  Description  Document Monika Fall Risk and appropriate interventions in the flowsheet.   Outcome: Progressing Towards Goal  Note:   Fall Risk Interventions:            Medication Interventions: Patient to call before getting OOB, Teach patient to arise slowly                   Problem: Pain  Goal: *Control of Pain  Outcome: Progressing Towards Goal

## 2019-10-01 NOTE — PROGRESS NOTES
Cards  No problems overnight  Bisoprolol held  No cp, sob  Edema unchanged  Pacer check with 100% V pacing 28 % APace    Patient Vitals for the past 12 hrs:   Temp Pulse Resp BP SpO2   10/01/19 1004 97.8 °F (36.6 °C) (!) 55 15 153/60 92 %   10/01/19 0801 -- (!) 55 -- 152/70 --   10/01/19 0551 98 °F (36.7 °C) (!) 55 12 130/50 92 %   10/01/19 0157 97.9 °F (36.6 °C) (!) 56 17 138/50 96 %     No jvd  Clear lungs  2/6 KIERA  0-1+ edema      Impression:   TIA ? 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      Recommended:  Agree with add Plavix to ASA  If recurrent TIA then will need GALINDO  Instructed her on proper home bp technique  She is rushing her home bp checks, nurses to reinforce

## 2019-10-11 ENCOUNTER — OFFICE VISIT (OUTPATIENT)
Dept: NEUROLOGY | Age: 74
End: 2019-10-11

## 2019-10-11 VITALS
SYSTOLIC BLOOD PRESSURE: 150 MMHG | OXYGEN SATURATION: 97 % | DIASTOLIC BLOOD PRESSURE: 80 MMHG | BODY MASS INDEX: 27 KG/M2 | WEIGHT: 168 LBS | HEART RATE: 55 BPM | HEIGHT: 66 IN | RESPIRATION RATE: 14 BRPM

## 2019-10-11 DIAGNOSIS — Z91.89 STROKE RISK: ICD-10-CM

## 2019-10-11 DIAGNOSIS — G45.1 TIA INVOLVING CAROTID ARTERY: Primary | ICD-10-CM

## 2019-10-11 NOTE — LETTER
10/11/19 Patient: Dean Viera YOB: 1945 Date of Visit: 10/11/2019 Cheryle Gauthier MD 
[address] [place] [address] [phone] VIA Facsimile: [phone] Dear Cheryle Gauthier MD, Thank you for referring Ms. Rosangela Quiñones to [address] [place] [address] for evaluation. My notes for this consultation are attached. If you have questions, please do not hesitate to call me. I look forward to following your patient along with you. Sincerely, Maddiejoanna Jarvis, DO 
 
10/11/2019 Patient:  Dean Viera YOB: 1945 Date of Visit: 10/11/2019 Dear Cheryle Gauthier MD 
[address] [place] [address] [phone] VIA Facsimile: [phone] 
 : 
 
 
I was requested by Dilip Miller MD to evaluate Ms. Rosangela Quiñones  for Chief Complaint Patient presents with  Stroke Cass Ordoñez I am recommending the following:  
 
Diagnoses and all orders for this visit: 1. TIA involving carotid artery 2. Stroke risk 
 
 
 
---------------------------------------------------------------------------------------------------------------------- Below is my encounter: Chief Complaint Patient presents with  Stroke HPI Ms. Marvin Nguyen is a 47-year-old woman whom I saw in the hospital at the end of September. She presented with sudden ataxia and vertigo with some vision changes. She was brought to the hospital and admitted. CTA unrevealing for any acute issues. Echo abnormal out of concern of an aortic valve issue which she is seeing cardiology for. I added Plavix to her regimen of aspirin. Since I saw her last she is also had a blood pressure medications adjusted. She is doing well. No repeat symptoms. Review of Systems Musculoskeletal: Negative for falls. Psychiatric/Behavioral: The patient has insomnia. All other systems reviewed and are negative. 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  
 h/o breast cancer  Cerebral artery occlusion with cerebral infarction ([facility] [address]  Depression  Essential hypertension  Fatigue 10/27/2010  
 HTN (hypertension) 10/27/2010  S/P cardiac pacemaker procedure 10/27/2010  Unspecified sleep apnea   
 uses CPAP History reviewed. No pertinent family history. Social History Socioeconomic History  Marital status:  Spouse name: Not on file  Number of children: Not on file  Years of education: Not on file  Highest education level: Not on file Occupational History  Not on file Social Needs  Financial resource strain: Not on file  Food insecurity:  
  Worry: Not on file Inability: Not on file  Transportation needs:  
  Medical: Not on file Non-medical: Not on file Tobacco Use  Smoking status: Never Smoker  Smokeless tobacco: Never Used Substance and Sexual Activity  Alcohol use: Yes Alcohol/week: 1.0 standard drinks Types: 1 Glasses of wine per week Comment: social  
 Drug use: No  
 Sexual activity: Not Currently Lifestyle  Physical activity:  
  Days per week: Not on file Minutes per session: Not on file  Stress: Not on file Relationships  Social connections:  
  Talks on phone: Not on file Gets together: Not on file Attends Pent[employer]ostalism service: Not on file Active member of club or organization: Not on file Attends meetings of clubs or organizations: Not on file Relationship status: Not on file  Intimate partner violence:  
  Fear of current or ex partner: Not on file Emotionally abused: Not on file Physically abused: Not on file Forced sexual activity: Not on file Other Topics Concern  Not on file Social History Narrative  Not on file Allergies Allergen Reactions  Ivp Dye [Fd And C Blue No.1] Not Reported This Time  Tetracycline Hives Current Outpatient Medications Medication Sig  
 atorvastatin (LIPITOR) 40 mg tablet Take 1 Tab by mouth daily.  clopidogrel (PLAVIX) 75 mg tab Take 1 Tab by mouth daily.  bumetanide (BUMEX) 1 mg tablet Take 1 mg by mouth daily.  dicyclomine (BENTYL) 10 mg capsule Take 10 mg by mouth as needed.  trazodone (DESYREL) 50 mg tablet Take 50 mg by mouth nightly as needed.  bisoprolol (ZEBETA) 5 mg tablet Take 5 mg by mouth two (2) times a day.  aspirin 81 mg chewable tablet Take 81 mg by mouth daily.  losartan (COZAAR) 25 mg tablet Take 25 mg by mouth daily.  verapamil (CALAN) 120 mg tablet Take 120 mg by mouth daily (with breakfast).  verapamil SR (CALAN SR) 240 mg CR tablet Take 240 mg by mouth nightly. No current facility-administered medications for this visit. Neurologic Exam  
 
Mental Status WD/WN adult in NAD, normal grooming VSS 
A&O x 3 PERRL, nonicteric Face is symmetric, tongue midline Speech is fluent and clear No limb ataxia. No abnl movements. Moving all extemities spontaneously and symmetric Normal gait CVS RRR Lungs nonlabored Skin is warm and dry Visit Vitals /80 Pulse (!) 55 Resp 14 Ht 5' 5.5\" (1.664 m) Wt 76.2 kg (168 lb) SpO2 97% BMI 27.53 kg/m² Assessment and Plan Diagnoses and all orders for this visit: 1. TIA involving carotid artery 2. Stroke risk 77-year-old woman whom I suspect had an acute TIA at the end of September. She does have stroke risk factors. Her hospital course was uneventful which is reassuring and she is tolerating dual antiplatelet. We had a long conversation about stroke education in general we talked about her work-up in the hospital.  Continue to see primary care and cardiology. No need to follow-up here. I reviewed and decided to continue the current medications. This clinical note was dictated with an electronic dictation software that can make unintentional errors. If there are any questions, please contact me directly for clarification. Thank you for giving me the opportunity to assist in the care of Ms. Jean Paul Ace. If you have questions, please do not hesitate to contact me. Sincerely, [address] [facility], DO Neurologist 
Brain Injury Medicine Diplomate ABPN

## 2019-10-11 NOTE — PROGRESS NOTES
Chief Complaint   Patient presents with    Stroke       HPI    Ms. Kimberly Greenberg is a 68-year-old woman whom I saw in the hospital at the end of September. She presented with sudden ataxia and vertigo with some vision changes. She was brought to the hospital and admitted. CTA unrevealing for any acute issues. Echo abnormal out of concern of an aortic valve issue which she is seeing cardiology for. I added Plavix to her regimen of aspirin. Since I saw her last she is also had a blood pressure medications adjusted. She is doing well. No repeat symptoms. Review of Systems   Musculoskeletal: Negative for falls. Psychiatric/Behavioral: The patient has insomnia. All other systems reviewed and are negative. 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 No pertinent family history. Social History     Socioeconomic History    Marital status:      Spouse name: Not on file    Number of children: Not on file    Years of education: Not on file    Highest education level: Not on file   Occupational History    Not on file   Social Needs    Financial resource strain: Not on file    Food insecurity:     Worry: Not on file     Inability: Not on file    Transportation needs:     Medical: Not on file     Non-medical: Not on file   Tobacco Use    Smoking status: Never Smoker    Smokeless tobacco: Never Used   Substance and Sexual Activity    Alcohol use:  Yes     Alcohol/week: 1.0 standard drinks     Types: 1 Glasses of wine per week Comment: social    Drug use: No    Sexual activity: Not Currently   Lifestyle    Physical activity:     Days per week: Not on file     Minutes per session: Not on file    Stress: Not on file   Relationships    Social connections:     Talks on phone: Not on file     Gets together: Not on file     Attends Oriental orthodox service: Not on file     Active member of club or organization: Not on file     Attends meetings of clubs or organizations: Not on file     Relationship status: Not on file    Intimate partner violence:     Fear of current or ex partner: Not on file     Emotionally abused: Not on file     Physically abused: Not on file     Forced sexual activity: Not on file   Other Topics Concern    Not on file   Social History Narrative    Not on file     Allergies   Allergen Reactions    Ivp Dye [Fd And C Blue No.1] Not Reported This Time    Tetracycline Hives         Current Outpatient Medications   Medication Sig    atorvastatin (LIPITOR) 40 mg tablet Take 1 Tab by mouth daily.  clopidogrel (PLAVIX) 75 mg tab Take 1 Tab by mouth daily.  bumetanide (BUMEX) 1 mg tablet Take 1 mg by mouth daily.  dicyclomine (BENTYL) 10 mg capsule Take 10 mg by mouth as needed.  trazodone (DESYREL) 50 mg tablet Take 50 mg by mouth nightly as needed.  bisoprolol (ZEBETA) 5 mg tablet Take 5 mg by mouth two (2) times a day.  aspirin 81 mg chewable tablet Take 81 mg by mouth daily.  losartan (COZAAR) 25 mg tablet Take 25 mg by mouth daily.  verapamil (CALAN) 120 mg tablet Take 120 mg by mouth daily (with breakfast).  verapamil SR (CALAN SR) 240 mg CR tablet Take 240 mg by mouth nightly. No current facility-administered medications for this visit. Neurologic Exam     Mental Status   WD/WN adult in NAD, normal grooming  VSS  A&O x 3    PERRL, nonicteric  Face is symmetric, tongue midline  Speech is fluent and clear  No limb ataxia. No abnl movements.   Moving all extemities spontaneously and symmetric  Normal gait    CVS RRR  Lungs nonlabored  Skin is warm and dry         Visit Vitals  /80   Pulse (!) 55   Resp 14   Ht 5' 5.5\" (1.664 m)   Wt 76.2 kg (168 lb)   SpO2 97%   BMI 27.53 kg/m²       Assessment and Plan   Diagnoses and all orders for this visit:    1. TIA involving carotid artery    2. Stroke risk      47-year-old woman whom I suspect had an acute TIA at the end of September. She does have stroke risk factors. Her hospital course was uneventful which is reassuring and she is tolerating dual antiplatelet. We had a long conversation about stroke education in general we talked about her work-up in the hospital.  Continue to see primary care and cardiology. No need to follow-up here. I reviewed and decided to continue the current medications. This clinical note was dictated with an electronic dictation software that can make unintentional errors. If there are any questions, please contact me directly for clarification.       [address] [facility], [facility] [address]  Diplomate PURAN

## 2022-03-19 PROBLEM — I63.9 ACUTE CVA (CEREBROVASCULAR ACCIDENT) (HCC): Status: ACTIVE | Noted: 2019-09-29

## 2022-10-07 ENCOUNTER — APPOINTMENT (OUTPATIENT)
Dept: CT IMAGING | Age: 77
DRG: 391 | End: 2022-10-07
Attending: STUDENT IN AN ORGANIZED HEALTH CARE EDUCATION/TRAINING PROGRAM
Payer: MEDICARE

## 2022-10-07 ENCOUNTER — HOSPITAL ENCOUNTER (INPATIENT)
Age: 77
LOS: 3 days | Discharge: HOME HEALTH | DRG: 391 | End: 2022-10-10
Attending: STUDENT IN AN ORGANIZED HEALTH CARE EDUCATION/TRAINING PROGRAM | Admitting: FAMILY MEDICINE
Payer: MEDICARE

## 2022-10-07 ENCOUNTER — APPOINTMENT (OUTPATIENT)
Dept: GENERAL RADIOLOGY | Age: 77
DRG: 391 | End: 2022-10-07
Attending: STUDENT IN AN ORGANIZED HEALTH CARE EDUCATION/TRAINING PROGRAM
Payer: MEDICARE

## 2022-10-07 DIAGNOSIS — M19.90 ARTHRITIS: Chronic | ICD-10-CM

## 2022-10-07 DIAGNOSIS — I50.9 ACUTE ON CHRONIC CONGESTIVE HEART FAILURE, UNSPECIFIED HEART FAILURE TYPE (HCC): ICD-10-CM

## 2022-10-07 DIAGNOSIS — I50.32 CHRONIC DIASTOLIC CHF (CONGESTIVE HEART FAILURE), NYHA CLASS 3 (HCC): ICD-10-CM

## 2022-10-07 DIAGNOSIS — I48.92 PAROXYSMAL ATRIAL FLUTTER (HCC): ICD-10-CM

## 2022-10-07 DIAGNOSIS — R09.02 HYPOXIA: Primary | ICD-10-CM

## 2022-10-07 DIAGNOSIS — R10.84 ABDOMINAL PAIN, GENERALIZED: ICD-10-CM

## 2022-10-07 DIAGNOSIS — Z95.0 PACEMAKER: ICD-10-CM

## 2022-10-07 PROBLEM — J96.90 RESPIRATORY FAILURE (HCC): Status: ACTIVE | Noted: 2022-10-07

## 2022-10-07 LAB
ALBUMIN SERPL-MCNC: 3 G/DL (ref 3.5–5)
ALBUMIN/GLOB SERPL: 0.8 {RATIO} (ref 1.1–2.2)
ALP SERPL-CCNC: 78 U/L (ref 45–117)
ALT SERPL-CCNC: 22 U/L (ref 12–78)
AMORPH CRY URNS QL MICRO: ABNORMAL
ANION GAP SERPL CALC-SCNC: 7 MMOL/L (ref 5–15)
APPEARANCE UR: CLEAR
AST SERPL-CCNC: 36 U/L (ref 15–37)
BACTERIA URNS QL MICRO: NEGATIVE /HPF
BASOPHILS # BLD: 0 K/UL (ref 0–0.1)
BASOPHILS NFR BLD: 0 % (ref 0–1)
BILIRUB SERPL-MCNC: 0.8 MG/DL (ref 0.2–1)
BILIRUB UR QL: NEGATIVE
BNP SERPL-MCNC: 1055 PG/ML (ref 0–450)
BUN SERPL-MCNC: 12 MG/DL (ref 6–20)
BUN/CREAT SERPL: 15 (ref 12–20)
CALCIUM SERPL-MCNC: 10.3 MG/DL (ref 8.5–10.1)
CHLORIDE SERPL-SCNC: 97 MMOL/L (ref 97–108)
CO2 SERPL-SCNC: 30 MMOL/L (ref 21–32)
COLOR UR: ABNORMAL
CREAT SERPL-MCNC: 0.79 MG/DL (ref 0.55–1.02)
DIFFERENTIAL METHOD BLD: ABNORMAL
EOSINOPHIL # BLD: 0 K/UL (ref 0–0.4)
EOSINOPHIL NFR BLD: 0 % (ref 0–7)
EPITH CASTS URNS QL MICRO: ABNORMAL /LPF
ERYTHROCYTE [DISTWIDTH] IN BLOOD BY AUTOMATED COUNT: 13.1 % (ref 11.5–14.5)
GLOBULIN SER CALC-MCNC: 4 G/DL (ref 2–4)
GLUCOSE SERPL-MCNC: 142 MG/DL (ref 65–100)
GLUCOSE UR STRIP.AUTO-MCNC: 500 MG/DL
HCT VFR BLD AUTO: 35.7 % (ref 35–47)
HGB BLD-MCNC: 11.7 G/DL (ref 11.5–16)
HGB UR QL STRIP: ABNORMAL
IMM GRANULOCYTES # BLD AUTO: 0.1 K/UL (ref 0–0.04)
IMM GRANULOCYTES NFR BLD AUTO: 1 % (ref 0–0.5)
KETONES UR QL STRIP.AUTO: NEGATIVE MG/DL
LEUKOCYTE ESTERASE UR QL STRIP.AUTO: NEGATIVE
LIPASE SERPL-CCNC: 95 U/L (ref 73–393)
LYMPHOCYTES # BLD: 1.1 K/UL (ref 0.8–3.5)
LYMPHOCYTES NFR BLD: 11 % (ref 12–49)
MAGNESIUM SERPL-MCNC: 2.4 MG/DL (ref 1.6–2.4)
MCH RBC QN AUTO: 31.1 PG (ref 26–34)
MCHC RBC AUTO-ENTMCNC: 32.8 G/DL (ref 30–36.5)
MCV RBC AUTO: 94.9 FL (ref 80–99)
MONOCYTES # BLD: 1 K/UL (ref 0–1)
MONOCYTES NFR BLD: 10 % (ref 5–13)
NEUTS SEG # BLD: 7.7 K/UL (ref 1.8–8)
NEUTS SEG NFR BLD: 78 % (ref 32–75)
NITRITE UR QL STRIP.AUTO: NEGATIVE
NRBC # BLD: 0 K/UL (ref 0–0.01)
NRBC BLD-RTO: 0 PER 100 WBC
PH UR STRIP: 7.5 [PH] (ref 5–8)
PLATELET # BLD AUTO: 265 K/UL (ref 150–400)
PMV BLD AUTO: 10.2 FL (ref 8.9–12.9)
POTASSIUM SERPL-SCNC: 4.9 MMOL/L (ref 3.5–5.1)
PROT SERPL-MCNC: 7 G/DL (ref 6.4–8.2)
PROT UR STRIP-MCNC: ABNORMAL MG/DL
RBC # BLD AUTO: 3.76 M/UL (ref 3.8–5.2)
RBC #/AREA URNS HPF: ABNORMAL /HPF (ref 0–5)
SODIUM SERPL-SCNC: 134 MMOL/L (ref 136–145)
SP GR UR REFRACTOMETRY: 1.01 (ref 1–1.03)
TROPONIN-HIGH SENSITIVITY: 15 NG/L (ref 0–51)
TROPONIN-HIGH SENSITIVITY: 19 NG/L (ref 0–51)
UR CULT HOLD, URHOLD: NORMAL
UROBILINOGEN UR QL STRIP.AUTO: 4 EU/DL (ref 0.2–1)
WBC # BLD AUTO: 10 K/UL (ref 3.6–11)
WBC URNS QL MICRO: ABNORMAL /HPF (ref 0–4)

## 2022-10-07 PROCEDURE — 74177 CT ABD & PELVIS W/CONTRAST: CPT

## 2022-10-07 PROCEDURE — 36415 COLL VENOUS BLD VENIPUNCTURE: CPT

## 2022-10-07 PROCEDURE — 65270000046 HC RM TELEMETRY

## 2022-10-07 PROCEDURE — 93005 ELECTROCARDIOGRAM TRACING: CPT

## 2022-10-07 PROCEDURE — 85025 COMPLETE CBC W/AUTO DIFF WBC: CPT

## 2022-10-07 PROCEDURE — 71046 X-RAY EXAM CHEST 2 VIEWS: CPT

## 2022-10-07 PROCEDURE — 74011250636 HC RX REV CODE- 250/636: Performed by: FAMILY MEDICINE

## 2022-10-07 PROCEDURE — 96375 TX/PRO/DX INJ NEW DRUG ADDON: CPT

## 2022-10-07 PROCEDURE — 99285 EMERGENCY DEPT VISIT HI MDM: CPT

## 2022-10-07 PROCEDURE — 74011250636 HC RX REV CODE- 250/636: Performed by: STUDENT IN AN ORGANIZED HEALTH CARE EDUCATION/TRAINING PROGRAM

## 2022-10-07 PROCEDURE — 83735 ASSAY OF MAGNESIUM: CPT

## 2022-10-07 PROCEDURE — 96376 TX/PRO/DX INJ SAME DRUG ADON: CPT

## 2022-10-07 PROCEDURE — 84484 ASSAY OF TROPONIN QUANT: CPT

## 2022-10-07 PROCEDURE — 81001 URINALYSIS AUTO W/SCOPE: CPT

## 2022-10-07 PROCEDURE — 96374 THER/PROPH/DIAG INJ IV PUSH: CPT

## 2022-10-07 PROCEDURE — 74011000250 HC RX REV CODE- 250: Performed by: STUDENT IN AN ORGANIZED HEALTH CARE EDUCATION/TRAINING PROGRAM

## 2022-10-07 PROCEDURE — 83880 ASSAY OF NATRIURETIC PEPTIDE: CPT

## 2022-10-07 PROCEDURE — 83690 ASSAY OF LIPASE: CPT

## 2022-10-07 PROCEDURE — 74011000636 HC RX REV CODE- 636: Performed by: STUDENT IN AN ORGANIZED HEALTH CARE EDUCATION/TRAINING PROGRAM

## 2022-10-07 PROCEDURE — 80053 COMPREHEN METABOLIC PANEL: CPT

## 2022-10-07 RX ORDER — KETOROLAC TROMETHAMINE 30 MG/ML
15 INJECTION, SOLUTION INTRAMUSCULAR; INTRAVENOUS
Status: COMPLETED | OUTPATIENT
Start: 2022-10-08 | End: 2022-10-07

## 2022-10-07 RX ORDER — MORPHINE SULFATE 2 MG/ML
2 INJECTION, SOLUTION INTRAMUSCULAR; INTRAVENOUS
Status: COMPLETED | OUTPATIENT
Start: 2022-10-07 | End: 2022-10-07

## 2022-10-07 RX ORDER — KETOROLAC TROMETHAMINE 30 MG/ML
15 INJECTION, SOLUTION INTRAMUSCULAR; INTRAVENOUS
Status: COMPLETED | OUTPATIENT
Start: 2022-10-07 | End: 2022-10-07

## 2022-10-07 RX ORDER — ONDANSETRON 2 MG/ML
4 INJECTION INTRAMUSCULAR; INTRAVENOUS
Status: COMPLETED | OUTPATIENT
Start: 2022-10-07 | End: 2022-10-07

## 2022-10-07 RX ORDER — LIDOCAINE 4 G/100G
2 PATCH TOPICAL EVERY 24 HOURS
Status: DISCONTINUED | OUTPATIENT
Start: 2022-10-07 | End: 2022-10-10 | Stop reason: HOSPADM

## 2022-10-07 RX ADMIN — MORPHINE SULFATE 2 MG: 2 INJECTION, SOLUTION INTRAMUSCULAR; INTRAVENOUS at 09:56

## 2022-10-07 RX ADMIN — KETOROLAC TROMETHAMINE 15 MG: 30 INJECTION, SOLUTION INTRAMUSCULAR; INTRAVENOUS at 23:43

## 2022-10-07 RX ADMIN — MORPHINE SULFATE 2 MG: 2 INJECTION, SOLUTION INTRAMUSCULAR; INTRAVENOUS at 09:11

## 2022-10-07 RX ADMIN — IOPAMIDOL 100 ML: 755 INJECTION, SOLUTION INTRAVENOUS at 10:44

## 2022-10-07 RX ADMIN — KETOROLAC TROMETHAMINE 15 MG: 30 INJECTION, SOLUTION INTRAMUSCULAR at 15:15

## 2022-10-07 RX ADMIN — ONDANSETRON 4 MG: 2 INJECTION INTRAMUSCULAR; INTRAVENOUS at 09:11

## 2022-10-07 NOTE — PROGRESS NOTES
Patient adamant that she has not had any kind of reaction with [place] [facility] [place] being premedicated. Patient states last scan was at [facility] - 6 months ago. Per Dr Aldo Pfeiffer - ok to give contrast. Per Dr Aldo Pfeiffer, call pharmacy to remove allergy after scan.

## 2022-10-07 NOTE — PROGRESS NOTES
TRANSFER - IN REPORT:Verbal report received from [facility]) on Candice Spears  being received from [facility](unit) for routine progression of care Report consisted of patients Situation, Background, Assessment and Recommendations(SBAR). Information from the following report(s) SBAR and ED Summary was reviewed with the receiving nurse. Opportunity for questions and clarification was provided. Assessment completed upon patients arrival to unit and care assumed. 1930 Bedside shift change report given to RN (oncoming nurse) by Baljeet Nascimento (offgoing nurse). Report included the following information SBAR and ED Summary.

## 2022-10-07 NOTE — ED NOTES
Bedside and Verbal shift change report given to Yarelis Nolasco RN (oncoming nurse) by Veronique Hawley RN (offgoing nurse). Report included the following information SBAR, ED Summary, Intake/Output, MAR, and Recent Results.

## 2022-10-07 NOTE — ED PROVIDER NOTES
Patient is a 20-year-old female with a history of CHF, hypertension who is followed by doctors at [facility] who was placed on Jardiance in August and had significant side effects from it but it did help with her fluid status. She stopped taking it after about a week but has had poor bowel movements and abdominal pain since that time. U doctors call again recommend taking half a tablet so she started that again and the symptoms of her abdominal pain returned. Patient has had severe abdominal pain and low back pain since yesterday morning, also reporting some confusion. Patient stopped taking Jardiance 3-4 days ago. Patient in extreme discomfort with tears in her eyes. Denies any chest pain or shortness of breath. The history is provided by the patient and medical records. Abdominal Pain   This is a recurrent problem. The current episode started yesterday. The problem occurs constantly. The problem has been gradually worsening. Associated with: Medication changes. The pain is located in the LLQ and RLQ. The pain is at a severity of 8/10. The pain is severe. Associated symptoms include nausea and constipation. Pertinent negatives include no vomiting. Exacerbated by: [place] use. The pain is relieved by Nothing. Past workup includes no CT scan, no ultrasound. Her past medical history does not include diverticulitis. The patient's surgical history non-contributory.      Past Medical History:   Diagnosis Date    Aortic stenosis 10/27/2010    Aortic valve replaced 10/27/2010    Atrioventricular block, complete ([address] 10/27/2010    CAD (coronary artery disease)     Cancer ([facility] [address] 2004    h/o breast cancer    Cerebral artery occlusion with cerebral infarction [facility])     Depression     Essential hypertension     Fatigue 10/27/2010    HTN (hypertension) 10/27/2010    S/P cardiac pacemaker procedure 10/27/2010    Unspecified sleep apnea     uses CPAP       Past Surgical History:   Procedure Laterality Date    ABDOMEN SURGERY PROC UNLISTED      [facility]    BREAST SURGERY PROCEDURE UNLISTED      mastectomy    HX HEART CATHETERIZATION      HX HEART VALVE SURGERY  3/2010    porcine AVR    HX HERNIA REPAIR      HX PACEMAKER  3/24/2010    [facility], complete heart block    HX TONSILLECTOMY           No family history on file. Social History     Socioeconomic History    Marital status:      Spouse name: Not on file    Number of children: Not on file    Years of education: Not on file    Highest education level: Not on file   Occupational History    Not on file   Tobacco Use    Smoking status: Never    Smokeless tobacco: Never   Substance and Sexual Activity    Alcohol use: Yes     Alcohol/week: 1.0 standard drink     Types: 1 Glasses of wine per week     Comment: social    Drug use: No    Sexual activity: Not Currently   Other Topics Concern    Not on file   Social History Narrative    Not on file     Social Determinants of Health     Financial Resource Strain: Not on file   Food Insecurity: Not on file   Transportation Needs: Not on file   Physical Activity: Not on file   Stress: Not on file   Social Connections: Not on file   Intimate Partner Violence: Not on file   Housing Stability: Not on file         ALLERGIES: Tetracycline    Review of Systems   Constitutional:  Positive for activity change, appetite change and fatigue. HENT: Negative. Eyes: Negative. Respiratory: Negative. Cardiovascular: Negative. Gastrointestinal:  Positive for abdominal pain, constipation and nausea. Negative for vomiting. Endocrine: Negative. Genitourinary: Negative. Musculoskeletal: Negative. Skin: Negative. Allergic/Immunologic: Negative. Neurological: Negative. Hematological: Negative. Psychiatric/Behavioral:  Positive for confusion.       Vitals:    10/07/22 0851   BP: (!) 162/97   Pulse: 79   Resp: 16   SpO2: 98%   Weight: 73.4 kg (161 lb 13.1 oz)            Physical Exam  Vitals and nursing note reviewed. Constitutional:       General: She is in acute distress. Appearance: Normal appearance. HENT:      Head: Normocephalic and atraumatic. Right Ear: External ear normal.      Left Ear: External ear normal.      Nose: Nose normal.   Eyes:      Extraocular Movements: Extraocular movements intact. Conjunctiva/sclera: Conjunctivae normal.   Cardiovascular:      Rate and Rhythm: Normal rate. Pulses: Normal pulses. Radial pulses are 2+ on the right side and 2+ on the left side. Heart sounds: Normal heart sounds. Pulmonary:      Effort: Pulmonary effort is normal.      Breath sounds: Normal breath sounds. Chest:      Chest wall: No deformity or tenderness. Abdominal:      General: Abdomen is flat. There is no distension. Tenderness: There is abdominal tenderness. There is no right CVA tenderness. Musculoskeletal:         General: Tenderness present. No deformity or signs of injury. Normal range of motion. Cervical back: Normal range of motion and neck supple. No tenderness. Comments: Generalized low back tenderness   Skin:     General: Skin is warm and dry. Capillary Refill: Capillary refill takes less than 2 seconds. Neurological:      General: No focal deficit present. Mental Status: She is alert and oriented to person, place, and time. Psychiatric:         Attention and Perception: Attention normal.         Mood and Affect: Mood normal.         Behavior: Behavior normal.        [facility]    ED Course as of 10/08/22 0739   Fri Oct 07, 2022   0902 EKG interpretation:   Rhythm: paced; and regular . Rate (approx.): 74; EKG documented and interpreted by Stefanie Leija.  Ramon Aldridge MD, Emergency Medicine.       [AL]      ED Course User Index  [AL] Neil Waggoner MD       LABORATORY RESULTS:  Labs Reviewed   CBC WITH AUTOMATED DIFF - Abnormal; Notable for the following components:       Result Value    RBC 3.76 (*)     NEUTROPHILS 78 (*)     LYMPHOCYTES 11 (*)     IMMATURE GRANULOCYTES 1 (*)     ABS. IMM. GRANS. 0.1 (*)     All other components within normal limits   METABOLIC PANEL, COMPREHENSIVE - Abnormal; Notable for the following components:    Sodium 134 (*)     Glucose 142 (*)     Calcium 10.3 (*)     Albumin 3.0 (*)     A-G Ratio 0.8 (*)     All other components within normal limits   NT-PRO BNP - Abnormal; Notable for the following components:    NT pro-BNP 1,055 (*)     All other components within normal limits   URINALYSIS W/MICROSCOPIC - Abnormal; Notable for the following components:    Protein TRACE (*)     Glucose 500 (*)     Blood TRACE (*)     Urobilinogen 4.0 (*)     Amorphous Crystals 2+ (*)     All other components within normal limits   URINE CULTURE HOLD SAMPLE   LIPASE   MAGNESIUM   TROPONIN-HIGH SENSITIVITY   TROPONIN-HIGH SENSITIVITY   SAMPLES BEING HELD       IMAGING RESULTS:  CT ABD PELV W CONT   Final Result      1. Diverticulosis of the left colon. No acute diverticulitis      XR CHEST PA LAT   Final Result   1. Enlarged cardiac silhouette, otherwise no acute disease              MEDICATIONS GIVEN:  Medications   ondansetron (ZOFRAN) injection 4 mg (4 mg IntraVENous Given 10/7/22 0911)   morphine injection 2 mg (2 mg IntraVENous Given 10/7/22 0911)   iopamidoL (ISOVUE-370) 76 % injection 100 mL (100 mL IntraVENous Given 10/7/22 1044)   morphine injection 2 mg (2 mg IntraVENous Given 10/7/22 0956)   ketorolac (TORADOL) injection 15 mg (15 mg IntraVENous Given 10/7/22 1515)   ketorolac (TORADOL) injection 15 mg (15 mg IntraVENous Given 10/7/22 2343)       Differential diagnosis: Constipation, abdominal pain, appendicitis, diverticulitis, urinary retention, medication side effect, small bowel obstruction, nausea and vomiting, heart failure exacerbation, ACS     ED physician interpretation of imaging: Chest X without focal pneumonia, pneumothorax  ED physician interpretation of EKG: No STEMI.   See my interpretation EKG in ED course above.  ED physician interpretation of laboratory results: CBC, CMP without critical values. BNP elevated at 1055. Baseline unknown. However based on hypoxia patient may have CHF exacerbation. Urine without signs of infection    MDM: Patient is a 77-year-old female presenting the ED with abdominal pain and back pain with unremarkable CT scans and lab work found to be hypoxic with an elevated BNP with possible acute on chronic CHF exacerbation requiring hospitalization for new oxygen requirement. Patient's pain improved with morphine. However returned when medication effects began to wane. Hospitalist service contacted and patient admitted. DISPOSITION: Admitted     Perfect Serve Consult for Admission  2:12 PM    ED Room Number: SER06/06  Patient Name and age:  Albina Pratt 64 y.o.  female  Working Diagnosis:   1. Hypoxia    2. Abdominal pain, generalized    3. Acute on chronic congestive heart failure, unspecified heart failure type ([address]        COVID-19 Suspicion:  no  Sepsis present:  no  Reassessment needed: no  Code Status:  Full Code  Readmission: no  Isolation Requirements:  no  Recommended Level of Care:  telemetry  Department: [facility] - [phone]    Other: Patient is a 77-year-old female presented to the ED with abdominal pain and back pain. No concerning findings on CT scan abdomen pelvis. However patient's BNP returned elevated at 1000 with an unknown baseline. Patient has a history of severe HF. Patient became hypoxic to 84% on room air while awake alert and talking. Attempted to wean without success patient requiring 2 L nasal cannula oxygen. Yee Blount.  Sharyle Hazel, MD      Procedures

## 2022-10-07 NOTE — ROUTINE PROCESS
Patient adamant that she has not had any kind of reaction with her scans at [facility] with contrast without being premedicated. Patient states last scan was at [facility] - 6 months ago. Per Dr Bren Luong - ok to give contrast. Per Dr Bren Luong, call pharmacy to remove allergy after scan.

## 2022-10-07 NOTE — ED TRIAGE NOTES
Patient arrives with c/o low back  and abdominal pain. Patient reports abdomen feels like a \"rock\". Patient states she was started on Jardiance since august and isn't sure if the medicine has done this to her. Reports last normal BM over a month ago.

## 2022-10-08 PROBLEM — I50.33 ACUTE ON CHRONIC HEART FAILURE WITH PRESERVED EJECTION FRACTION (HCC): Status: ACTIVE | Noted: 2022-10-08

## 2022-10-08 LAB
ALBUMIN SERPL-MCNC: 2.5 G/DL (ref 3.5–5)
ALBUMIN/GLOB SERPL: 0.7 {RATIO} (ref 1.1–2.2)
ALP SERPL-CCNC: 71 U/L (ref 45–117)
ALT SERPL-CCNC: 18 U/L (ref 12–78)
ANION GAP SERPL CALC-SCNC: 7 MMOL/L (ref 5–15)
AST SERPL-CCNC: 23 U/L (ref 15–37)
ATRIAL RATE: 74 BPM
BILIRUB SERPL-MCNC: 0.8 MG/DL (ref 0.2–1)
BUN SERPL-MCNC: 15 MG/DL (ref 6–20)
BUN/CREAT SERPL: 18 (ref 12–20)
CALCIUM SERPL-MCNC: 9.8 MG/DL (ref 8.5–10.1)
CALCULATED P AXIS, ECG09: 36 DEGREES
CALCULATED R AXIS, ECG10: -94 DEGREES
CALCULATED T AXIS, ECG11: 94 DEGREES
CHLORIDE SERPL-SCNC: 99 MMOL/L (ref 97–108)
CO2 SERPL-SCNC: 29 MMOL/L (ref 21–32)
CREAT SERPL-MCNC: 0.84 MG/DL (ref 0.55–1.02)
D DIMER PPP FEU-MCNC: 0.96 MG/L FEU (ref 0–0.65)
DIAGNOSIS, 93000: NORMAL
GLOBULIN SER CALC-MCNC: 3.5 G/DL (ref 2–4)
GLUCOSE SERPL-MCNC: 90 MG/DL (ref 65–100)
LIPASE SERPL-CCNC: 92 U/L (ref 73–393)
MAGNESIUM SERPL-MCNC: 2.4 MG/DL (ref 1.6–2.4)
P-R INTERVAL, ECG05: 198 MS
PHOSPHATE SERPL-MCNC: 3.9 MG/DL (ref 2.6–4.7)
POTASSIUM SERPL-SCNC: 4.4 MMOL/L (ref 3.5–5.1)
PROT SERPL-MCNC: 6 G/DL (ref 6.4–8.2)
Q-T INTERVAL, ECG07: 448 MS
QRS DURATION, ECG06: 160 MS
QTC CALCULATION (BEZET), ECG08: 497 MS
SODIUM SERPL-SCNC: 135 MMOL/L (ref 136–145)
TROPONIN-HIGH SENSITIVITY: 21 NG/L (ref 0–51)
TSH SERPL DL<=0.05 MIU/L-ACNC: 0.83 UIU/ML (ref 0.36–3.74)
VENTRICULAR RATE, ECG03: 74 BPM

## 2022-10-08 PROCEDURE — 74011250637 HC RX REV CODE- 250/637: Performed by: STUDENT IN AN ORGANIZED HEALTH CARE EDUCATION/TRAINING PROGRAM

## 2022-10-08 PROCEDURE — 99222 1ST HOSP IP/OBS MODERATE 55: CPT | Performed by: INTERNAL MEDICINE

## 2022-10-08 PROCEDURE — 36415 COLL VENOUS BLD VENIPUNCTURE: CPT

## 2022-10-08 PROCEDURE — 84100 ASSAY OF PHOSPHORUS: CPT

## 2022-10-08 PROCEDURE — 74011000250 HC RX REV CODE- 250: Performed by: INTERNAL MEDICINE

## 2022-10-08 PROCEDURE — 65270000029 HC RM PRIVATE

## 2022-10-08 PROCEDURE — 84443 ASSAY THYROID STIM HORMONE: CPT

## 2022-10-08 PROCEDURE — 74011250637 HC RX REV CODE- 250/637: Performed by: INTERNAL MEDICINE

## 2022-10-08 PROCEDURE — 85379 FIBRIN DEGRADATION QUANT: CPT

## 2022-10-08 PROCEDURE — 83735 ASSAY OF MAGNESIUM: CPT

## 2022-10-08 PROCEDURE — 74011000250 HC RX REV CODE- 250: Performed by: STUDENT IN AN ORGANIZED HEALTH CARE EDUCATION/TRAINING PROGRAM

## 2022-10-08 PROCEDURE — 84484 ASSAY OF TROPONIN QUANT: CPT

## 2022-10-08 PROCEDURE — 83690 ASSAY OF LIPASE: CPT

## 2022-10-08 PROCEDURE — 80053 COMPREHEN METABOLIC PANEL: CPT

## 2022-10-08 PROCEDURE — 74011250636 HC RX REV CODE- 250/636: Performed by: INTERNAL MEDICINE

## 2022-10-08 RX ORDER — TRAZODONE HYDROCHLORIDE 50 MG/1
50 TABLET ORAL
Status: DISCONTINUED | OUTPATIENT
Start: 2022-10-08 | End: 2022-10-10 | Stop reason: HOSPADM

## 2022-10-08 RX ORDER — LOSARTAN POTASSIUM 25 MG/1
25 TABLET ORAL DAILY
Status: DISCONTINUED | OUTPATIENT
Start: 2022-10-08 | End: 2022-10-10 | Stop reason: HOSPADM

## 2022-10-08 RX ORDER — DICYCLOMINE HYDROCHLORIDE 10 MG/1
10 CAPSULE ORAL AS NEEDED
Status: DISCONTINUED | OUTPATIENT
Start: 2022-10-08 | End: 2022-10-10 | Stop reason: HOSPADM

## 2022-10-08 RX ORDER — METOPROLOL TARTRATE 50 MG/1
50 TABLET ORAL 2 TIMES DAILY
COMMUNITY
End: 2022-10-10

## 2022-10-08 RX ORDER — BUMETANIDE 1 MG/1
1 TABLET ORAL DAILY
Status: DISCONTINUED | OUTPATIENT
Start: 2022-10-09 | End: 2022-10-10 | Stop reason: HOSPADM

## 2022-10-08 RX ORDER — TRAMADOL HYDROCHLORIDE 50 MG/1
50 TABLET ORAL
Status: DISCONTINUED | OUTPATIENT
Start: 2022-10-08 | End: 2022-10-10 | Stop reason: HOSPADM

## 2022-10-08 RX ORDER — METOPROLOL SUCCINATE 25 MG/1
25 TABLET, EXTENDED RELEASE ORAL DAILY
Status: DISCONTINUED | OUTPATIENT
Start: 2022-10-08 | End: 2022-10-10 | Stop reason: HOSPADM

## 2022-10-08 RX ORDER — MORPHINE SULFATE 2 MG/ML
2 INJECTION, SOLUTION INTRAMUSCULAR; INTRAVENOUS
Status: DISCONTINUED | OUTPATIENT
Start: 2022-10-08 | End: 2022-10-08

## 2022-10-08 RX ORDER — NALOXONE HYDROCHLORIDE 0.4 MG/ML
0.4 INJECTION, SOLUTION INTRAMUSCULAR; INTRAVENOUS; SUBCUTANEOUS AS NEEDED
Status: DISCONTINUED | OUTPATIENT
Start: 2022-10-08 | End: 2022-10-10 | Stop reason: HOSPADM

## 2022-10-08 RX ORDER — POLYETHYLENE GLYCOL 3350 17 G/17G
17 POWDER, FOR SOLUTION ORAL DAILY
Status: DISCONTINUED | OUTPATIENT
Start: 2022-10-08 | End: 2022-10-10 | Stop reason: HOSPADM

## 2022-10-08 RX ORDER — ACETAMINOPHEN 325 MG/1
650 TABLET ORAL
Status: DISCONTINUED | OUTPATIENT
Start: 2022-10-08 | End: 2022-10-10 | Stop reason: HOSPADM

## 2022-10-08 RX ORDER — BUMETANIDE 0.25 MG/ML
1 INJECTION INTRAMUSCULAR; INTRAVENOUS 2 TIMES DAILY
Status: DISCONTINUED | OUTPATIENT
Start: 2022-10-08 | End: 2022-10-08

## 2022-10-08 RX ORDER — ATORVASTATIN CALCIUM 40 MG/1
40 TABLET, FILM COATED ORAL DAILY
Status: DISCONTINUED | OUTPATIENT
Start: 2022-10-09 | End: 2022-10-10 | Stop reason: HOSPADM

## 2022-10-08 RX ORDER — OXYCODONE AND ACETAMINOPHEN 5; 325 MG/1; MG/1
1 TABLET ORAL
Status: DISCONTINUED | OUTPATIENT
Start: 2022-10-08 | End: 2022-10-08

## 2022-10-08 RX ORDER — POLYETHYLENE GLYCOL 3350 17 G/17G
17 POWDER, FOR SOLUTION ORAL DAILY PRN
Status: DISCONTINUED | OUTPATIENT
Start: 2022-10-08 | End: 2022-10-10 | Stop reason: HOSPADM

## 2022-10-08 RX ORDER — SENNOSIDES 8.6 MG/1
1 TABLET ORAL DAILY
Status: DISCONTINUED | OUTPATIENT
Start: 2022-10-08 | End: 2022-10-09

## 2022-10-08 RX ORDER — VERAPAMIL HYDROCHLORIDE 80 MG/1
120 TABLET ORAL
Status: DISCONTINUED | OUTPATIENT
Start: 2022-10-08 | End: 2022-10-08

## 2022-10-08 RX ORDER — METOPROLOL SUCCINATE 25 MG/1
25 TABLET, EXTENDED RELEASE ORAL 2 TIMES DAILY
Status: DISCONTINUED | OUTPATIENT
Start: 2022-10-08 | End: 2022-10-08

## 2022-10-08 RX ORDER — SODIUM CHLORIDE 0.9 % (FLUSH) 0.9 %
5-40 SYRINGE (ML) INJECTION EVERY 8 HOURS
Status: DISCONTINUED | OUTPATIENT
Start: 2022-10-08 | End: 2022-10-10 | Stop reason: HOSPADM

## 2022-10-08 RX ORDER — ONDANSETRON 2 MG/ML
4 INJECTION INTRAMUSCULAR; INTRAVENOUS
Status: DISCONTINUED | OUTPATIENT
Start: 2022-10-08 | End: 2022-10-10 | Stop reason: HOSPADM

## 2022-10-08 RX ORDER — SODIUM CHLORIDE 0.9 % (FLUSH) 0.9 %
5-40 SYRINGE (ML) INJECTION AS NEEDED
Status: DISCONTINUED | OUTPATIENT
Start: 2022-10-08 | End: 2022-10-10 | Stop reason: HOSPADM

## 2022-10-08 RX ORDER — ADHESIVE BANDAGE
30 BANDAGE TOPICAL ONCE
Status: COMPLETED | OUTPATIENT
Start: 2022-10-08 | End: 2022-10-08

## 2022-10-08 RX ORDER — ONDANSETRON 4 MG/1
4 TABLET, ORALLY DISINTEGRATING ORAL
Status: DISCONTINUED | OUTPATIENT
Start: 2022-10-08 | End: 2022-10-10 | Stop reason: HOSPADM

## 2022-10-08 RX ORDER — ACETAMINOPHEN 650 MG/1
650 SUPPOSITORY RECTAL
Status: DISCONTINUED | OUTPATIENT
Start: 2022-10-08 | End: 2022-10-10 | Stop reason: HOSPADM

## 2022-10-08 RX ORDER — VERAPAMIL HYDROCHLORIDE 240 MG/1
240 TABLET, FILM COATED, EXTENDED RELEASE ORAL
Status: DISCONTINUED | OUTPATIENT
Start: 2022-10-08 | End: 2022-10-08

## 2022-10-08 RX ORDER — ENOXAPARIN SODIUM 100 MG/ML
40 INJECTION SUBCUTANEOUS DAILY
Status: DISCONTINUED | OUTPATIENT
Start: 2022-10-08 | End: 2022-10-10 | Stop reason: HOSPADM

## 2022-10-08 RX ORDER — GUAIFENESIN 100 MG/5ML
81 LIQUID (ML) ORAL DAILY
Status: DISCONTINUED | OUTPATIENT
Start: 2022-10-08 | End: 2022-10-10 | Stop reason: HOSPADM

## 2022-10-08 RX ADMIN — SODIUM CHLORIDE, PRESERVATIVE FREE 10 ML: 5 INJECTION INTRAVENOUS at 15:08

## 2022-10-08 RX ADMIN — MORPHINE SULFATE 2 MG: 2 INJECTION, SOLUTION INTRAMUSCULAR; INTRAVENOUS at 09:25

## 2022-10-08 RX ADMIN — ENOXAPARIN SODIUM 40 MG: 100 INJECTION SUBCUTANEOUS at 11:10

## 2022-10-08 RX ADMIN — BUMETANIDE 1 MG: 0.25 INJECTION, SOLUTION INTRAMUSCULAR; INTRAVENOUS at 12:20

## 2022-10-08 RX ADMIN — SODIUM CHLORIDE, PRESERVATIVE FREE 5 ML: 5 INJECTION INTRAVENOUS at 06:00

## 2022-10-08 RX ADMIN — MAGNESIUM HYDROXIDE 30 ML: 400 SUSPENSION ORAL at 13:45

## 2022-10-08 RX ADMIN — OXYCODONE AND ACETAMINOPHEN 1 TABLET: 5; 325 TABLET ORAL at 11:09

## 2022-10-08 RX ADMIN — SODIUM CHLORIDE, PRESERVATIVE FREE 10 ML: 5 INJECTION INTRAVENOUS at 23:51

## 2022-10-08 RX ADMIN — SENNOSIDES 8.6 MG: 8.6 TABLET, FILM COATED ORAL at 12:20

## 2022-10-08 RX ADMIN — TRAMADOL HYDROCHLORIDE 50 MG: 50 TABLET, COATED ORAL at 20:23

## 2022-10-08 RX ADMIN — Medication 40 ML: at 13:45

## 2022-10-08 RX ADMIN — POLYETHYLENE GLYCOL 3350 17 G: 17 POWDER, FOR SOLUTION ORAL at 12:19

## 2022-10-08 NOTE — ACP (ADVANCE CARE PLANNING)
Advance Care Planning     Advance Care Planning (ACP) Physician/NP/PA Conversation      Date of Conversation: 10/7/2022  Conducted with: Patient with Decision Making Capacity    Healthcare Decision Maker:    Masood Timmons  Today we documented Decision Maker(s) consistent with Legal Next of Kin hierarchy. Care Preferences:    Hospitalization: \"If your health worsens and it becomes clear that your chance of recovery is unlikely, what would be your preference regarding hospitalization? \"  The patient is unsure. Ventilation: \"If you were unable to breathe on your own and your chance of recovery was unlikely, what would be your preference about the use of a ventilator (breathing machine) if it was available to you? \"   The patient would desire the use of a ventilator. Resuscitation: \"In the event your heart stopped as a result of an underlying serious health condition, would you want attempts to be made to restart your heart, or would you prefer a natural death? \"   Yes, attempt to resuscitate.     Additional topics discussed: treatment goals, benefit/burden of treatment options, ventilation preferences, hospitalization preferences, and resuscitation preferences    Conversation Outcomes / Follow-Up Plan:   ACP complete - no further action today  Reviewed DNR/DNI and patient elects Full Code (Attempt Resuscitation)     Length of Voluntary ACP Conversation in minutes:  16 minutes    Cayla Heaton MD

## 2022-10-08 NOTE — PROGRESS NOTES
Problem: Falls - Risk of  Goal: *Absence of Falls  Description: Document Jeff Wiley Fall Risk and appropriate interventions in the flowsheet.   Outcome: Progressing Towards Goal  Note: Fall Risk Interventions:                                Problem: Patient Education: Go to Patient Education Activity  Goal: Patient/Family Education  Outcome: Progressing Towards Goal     Problem: Pain  Goal: *Control of Pain  Outcome: Progressing Towards Goal  Goal: *PALLIATIVE CARE:  Alleviation of Pain  Outcome: Progressing Towards Goal

## 2022-10-08 NOTE — H&P
[facility]  HISTORY AND PHYSICAL    Name:  James Sierra  MR#:  [id number]  :  1945  ACCOUNT #:  [de-identified]  ADMIT DATE:  10/07/2022      The patient was seen, evaluated, and admitted by me on 10/07/2022. PRIMARY CARE PHYSICIAN:  Rao Arellano MD    SOURCE OF INFORMATION:  The patient and review of ED and old electronic medical record. CHIEF COMPLAINT:  Abdominal pain. HISTORY OF PRESENT ILLNESS:  This 75-year-old woman with past medical history significant for congestive heart failure, dyslipidemia, hypertension, aortic stenosis; status post aortic valve replacement with bioprosthetic, obstructive sleep apnea, status post pacemaker insertion secondary to AV block presented at the [facility] emergency room at [place] because of abdominal pain. The pain started a few days ago. The pain is constant dull ache with radiation to the back, 9/10 in severity. No known aggravating or relieving factors. The patient stated that she was recently diagnosed with congestive heart failure and her cardiologist started her on Jardiance. The patient believed that the [place] is what is causing her symptoms. She talked to her cardiologist, the dosage was reduced, and despite the reduced dosage, the patient continued to experience body aches and pain. She stopped the medication on her own. When the patient arrived at the emergency room, the patient was found to be hypoxic. She was placed on supplemental oxygen. Her BNP level was markedly elevated. She was referred to the hospitalist service for admission. She was last admitted to the hospital from 2019 to 10/01/2019. The patient was admitted for evaluation and treatment of acute CVA.     PAST MEDICAL HISTORY:  Dyslipidemia, congestive heart failure, hypertension, aortic stenosis; status post aortic valve replacement with bioprosthetic, obstructive sleep apnea, status post pacemaker insertion secondary to AV block. ALLERGIES:  THE PATIENT IS ALLERGIC TO TETRACYCLINE. MEDICATIONS:  1. Aspirin 81 mg daily. 2.  Lipitor 40 mg daily. 3.  Zebeta 5 mg twice daily. 4.  Bumex 1 mg daily. 5.  Bentyl, dosage as directed. 6.  Losartan 25 mg daily. 7.  Trazodone 50 mg daily at bedtime as needed. 8.  Verapamil 120 mg daily in the morning and verapamil 240 mg daily at bedtime. FAMILY HISTORY:  This was reviewed. Her father and mother had hypertension. PAST SURGICAL HISTORY:  This is significant for tonsillectomy, aortic valve replacement with bioprosthetic, and pacemaker insertion. SOCIAL HISTORY:  No history of alcohol or tobacco abuse. REVIEW OF SYSTEMS:  HEAD, EYES, EARS, NOSE AND THROAT:  No headache, no dizziness, no blurring of vision, and no photophobia. RESPIRATORY SYSTEM:  This is positive for shortness of breath. No cough and no hemoptysis. CARDIOVASCULAR SYSTEM:  This is positive for orthopnea. No chest pain and no palpitation. GASTROINTESTINAL SYSTEM:  This is positive for abdominal pain. No nausea or vomiting. No diarrhea and no constipation. GENITOURINARY SYSTEM:  No dysuria, no urgency, and no frequency. All other systems are reviewed and they are negative. PHYSICAL EXAMINATION:  GENERAL APPEARANCE:  The patient appeared ill and in moderate distress. VITAL SIGNS:  On arrival at the emergency room; temperature 98, pulse 79, respiratory rate 16, blood pressure 162/97, and oxygen saturation 98%. HEAD:  Normocephalic, atraumatic. EYES:  Normal eye movement. No redness, no drainage, no discharge. EARS:  Normal external ears with no obvious drainage. NOSE:  No deformity and no drainage. MOUTH AND THROAT:  No visible oral lesion. NECK:  Neck is supple. Mild JVD. No thyromegaly. CHEST:  Bilateral basal crackles. No wheezing. HEART:  Normal S1 and S2, regular. No clinically appreciable murmur. ABDOMEN:  Soft and nontender. Normal bowel sounds.   CNS:  Alert and oriented x3.  No gross focal neurological deficit. EXTREMITIES:  No edema. Pulses 2+ bilaterally. MUSCULOSKELETAL SYSTEM:  No obvious joint deformity and swelling. SKIN:  No active skin lesions seen in the exposed part of the body. PSYCHIATRY:  Normal mood and affect. LYMPHATIC SYSTEM:  No cervical lymphadenopathy. DIAGNOSTIC DATA:  The EKG shows pacemaker rhythm. No significant ST and T-waves abnormalities. Chest x-ray shows enlarged cardiac silhouette. Otherwise, no acute disease. The CT of the abdomen and pelvis shows diverticulosis of the left colon. No acute diverticulitis. LABORATORY DATA:  Hematology; WBC 10.0, hemoglobin 11.7, hematocrit 35.7, and platelets 975. Cardiac profile, troponin high sensitivity 15. Chemistry; sodium 134, potassium 4.9, chloride 97, CO2 of 30, glucose 142, BUN 12, creatinine 0.79, calcium 10.3, total bilirubin 0.8, ALT 22, AST 36, alkaline phosphatase 78, total protein 7.0, albumin level 3.0, and globulin 4.0. Pro-BNP level 1055. Magnesium level 2.4 and lipase 95. Urinalysis is significant for negative nitrite, negative leukocyte esterase, and negative bacteria. ASSESSMENT:  1. Acute-on-chronic heart failure with preserved ejection fraction. 2.  Dyslipidemia. 3.  Hypertension. 4.  Abdominal pain. 5.  Hyponatremia. 6.  Hyperglycemia. 7.  Status post pacemaker insertion secondary to atrioventricular block. 8.  Aortic stenosis, status post aortic valve replacement with bioprosthetic.  9.  Obstructive sleep apnea. PLAN:  1. Acute-on-chronic heart failure with preserved ejection fraction. We will admit the patient for further evaluation and treatment. This is most likely cause of the patient's shortness of breath. We will continue with Bumex. We will check serial cardiac markers to rule out acute myocardial infarction as another possible cause of shortness of breath.   We will check D-dimer to evaluate the patient for thromboembolism as another possible cause of shortness of breath. We will obtain echocardiogram.  Cardiology consult will be requested to assist in further evaluation and treatment. 2.  Dyslipidemia. We will continue with Lipitor. 3.  Hypertension. We will resume preadmission medication and monitor the patient's blood pressure closely. 4.  Abdominal pain. The CT scan of the abdomen and pelvis did not show any acute pathology. Lipase is within normal limits. We will carry out pain control. The patient may require Gastroenterology consult if there is no significant improvement in the next 24-48 hours for EGD to evaluate for gastritis. 5.  Hyponatremia. This is mild and is most likely due to the congestive heart failure. We will monitor the patient's sodium level closely. 6.  Hyperglycemia. We will check hemoglobin A1c level. The patient denies history of diabetes. 7.  Status post pacemaker insertion secondary to AV block. We will continue to monitor. The patient may require pacemaker interrogation. We will await further recommendation from the cardiologist.  8.  Aortic stenosis, status post aortic valve replacement with bioprosthetic. We will await the result of echocardiogram.  9.  Obstructive sleep apnea. We will place the patient on CPAP at bedtime. 10.  Other issues. Code status, the patient is a full code. We will place the patient on Lovenox for DVT prophylaxis. FUNCTIONAL STATUS PRIOR TO ADMISSION:  The patient came from home. The patient is ambulatory with no assistive device. COVID PRECAUTION:  The patient was wearing a face mask. I was wearing a face mask and gloves for this patient's encounter.       Jayesh Rm MD      [id number]  D:  10/08/2022 5:14  T:  10/08/2022 7:00  JOB #:  [id number]  CC:  Yenni Landis MD

## 2022-10-08 NOTE — PROGRESS NOTES
[address] [facility]  Hospitalist Group                                                                                          Hospitalist Progress Note  Cindy Gao MD  Answering service: [phone] or [phone] from in house phone        Date of Service:  10/8/2022  NAME:  Nikki Wilkerson  :  1945  MRN:  [id number]      Admission Summary:    HPI: \"64year-old woman with past medical history significant for congestive heart failure, dyslipidemia, hypertension, aortic stenosis; status post aortic valve replacement with bioprosthetic, obstructive sleep apnea, status post pacemaker insertion secondary to AV block presented at the [facility] emergency room at [place] because of abdominal pain. The pain started a few days ago. The pain is constant dull ache with radiation to the back, 9/10 in severity. No known aggravating or relieving factors. The patient stated that she was recently diagnosed with congestive heart failure and her cardiologist started her on Jardiance. The patient believed that the Valeta Juba is what is causing her symptoms. She talked to her cardiologist, the dosage was reduced, and despite the reduced dosage, the patient continued to experience body aches and pain. She stopped the medication on her own. When the patient arrived at the emergency room, the patient was found to be hypoxic. She was placed on supplemental oxygen. Her BNP level was markedly elevated. She was referred to the hospitalist service for admission. She was last admitted to the hospital from 2019 to 10/01/2019. The patient was admitted for evaluation and treatment of acute CVA. \"       Interval history / Subjective:   Patient seen examined at bedside earlier.   She was complaining abdominal pain has not had a bowel movement in the last 3 days, CT scan was negative     Assessment & Plan:     Chronic HFpEF  Abdominal pain likely constipation related  Chronic osteoarthritis  Status post pacemaker  AS s/p TAVR  -- Abdominal pain is likely from constipation and/or chronic osteoarthritis  - As needed pain control patient will need to be evaluated outpatient for long-term evaluation and management of her osteoarthritis  - GI cocktail, laxatives for treatment of constipation, would recommend caution with opioids as can worsen constipation  - Greatly appreciate cardiology evaluation, stable for discharge from their standpoint resume home medications can follow-up outpatient with [facility] cardiology and get her echo done outpatient canceled here  -Stay off Jardiance     PT OT evaluation    Plan to discharge in the next 24 hours     Code status: Full  Prophylaxis: Lovenox  Care Plan discussed with: Patient/family, nurse, case management  Anticipated Disposition: 24 hours home     Hospital Problems  Date Reviewed: 10/8/2022            Codes Class Noted POA    * (Principal) Acute on chronic heart failure with preserved ejection fraction ([facility] [address] [facility] ICD-10-CM: I50.33  ICD-9-CM: 428.23  10/8/2022 Yes        Respiratory failure ([facility] [address] ICD-10-CM: J96.90  ICD-9-CM: 518.81  10/7/2022 Unknown             Review of Systems:   A comprehensive review of systems was negative except for that written in the HPI. Vital Signs:    Last 24hrs VS reviewed since prior progress note. Most recent are:  Visit Vitals  /63   Pulse 83   Temp 98.2 °F (36.8 °C)   Resp 17   Ht 5' 5\" (1.651 m)   Wt 73.4 kg (161 lb 13.1 oz)   SpO2 98%   BMI 26.93 kg/m²       No intake or output data in the 24 hours ending 10/08/22 1238     Physical Examination:     I had a face to face encounter with this patient and independently examined them on 10/8/2022 as outlined below:          Constitutional:  No acute distress, cooperative, pleasant    ENT:  Oral mucosa moist, oropharynx benign. Resp:  CTA bilaterally. No wheezing/rhonchi/rales. No accessory muscle use.     CV:  Regular rhythm, normal rate, no murmurs, gallops, rubs GI:  Soft, non distended, non tender. normoactive bowel sounds, no hepatosplenomegaly     Musculoskeletal:  No edema, warm, 2+ pulses throughout    Neurologic:  Moves all extremities. AAOx3, CN II-XII reviewed            Data Review:    Review and/or order of clinical lab test  Review and/or order of tests in the radiology section of [facility]  Review and/or order of tests in the medicine section of [facility]      Labs:     Recent Labs     10/07/22  0906   WBC 10.0   HGB 11.7   HCT 35.7        Recent Labs     10/08/22  0540 10/07/22  0906   * 134*   K 4.4 4.9   CL 99 97   CO2 29 30   BUN 15 12   CREA 0.84 0.79   GLU 90 142*   CA 9.8 10.3*   MG 2.4 2.4   PHOS 3.9  --      Recent Labs     10/08/22  0540 10/07/22  0906   ALT 18 22   AP 71 78   TBILI 0.8 0.8   TP 6.0* 7.0   ALB 2.5* 3.0*   GLOB 3.5 4.0   LPSE 92 95     No results for input(s): INR, PTP, APTT, INREXT in the last 72 hours. No results for input(s): FE, TIBC, PSAT, FERR in the last 72 hours. No results found for: FOL, RBCF   No results for input(s): PH, PCO2, PO2 in the last 72 hours. No results for input(s): CPK, CKNDX, TROIQ in the last 72 hours.     No lab exists for component: CPKMB  Lab Results   Component Value Date/Time    Cholesterol, total 167 09/30/2019 01:51 AM    HDL Cholesterol 68 09/30/2019 01:51 AM    LDL, calculated 89 09/30/2019 01:51 AM    Triglyceride 50 09/30/2019 01:51 AM    CHOL/HDL Ratio 2.5 09/30/2019 01:51 AM     Lab Results   Component Value Date/Time    Glucose (POC) 117 (H) 09/29/2019 07:58 PM    Glucose (POC) 132 (H) 03/26/2010 06:37 AM    Glucose (POC) 149 (H) 03/25/2010 10:03 PM    Glucose (POC) 176 (H) 03/25/2010 07:43 PM    Glucose (POC) 130 (H) 03/25/2010 01:20 PM     Lab Results   Component Value Date/Time    Color YELLOW/STRAW 10/07/2022 11:45 AM    Appearance CLEAR 10/07/2022 11:45 AM    Specific gravity 1.010 10/07/2022 11:45 AM    Specific gravity 1.027 04/01/2010 06:00 PM    pH (UA) 7.5 10/07/2022 11:45 AM Protein TRACE (A) 10/07/2022 11:45 AM    Glucose 500 (A) 10/07/2022 11:45 AM    Ketone Negative 10/07/2022 11:45 AM    Bilirubin Negative 10/07/2022 11:45 AM    Urobilinogen 4.0 (H) 10/07/2022 11:45 AM    Nitrites Negative 10/07/2022 11:45 AM    Leukocyte Esterase Negative 10/07/2022 11:45 AM    Epithelial cells FEW 10/07/2022 11:45 AM    Bacteria Negative 10/07/2022 11:45 AM    WBC 0-4 10/07/2022 11:45 AM    RBC 0-5 10/07/2022 11:45 AM         Medications Reviewed:     Current Facility-Administered Medications   Medication Dose Route Frequency    aspirin chewable tablet 81 mg  81 mg Oral DAILY    [START ON 10/9/2022] atorvastatin (LIPITOR) tablet 40 mg  40 mg Oral DAILY    dicyclomine (BENTYL) capsule 10 mg  10 mg Oral PRN    losartan (COZAAR) tablet 25 mg  25 mg Oral DAILY    traZODone (DESYREL) tablet 50 mg  50 mg Oral QHS PRN    sodium chloride (NS) flush 5-40 mL  5-40 mL IntraVENous Q8H    sodium chloride (NS) flush 5-40 mL  5-40 mL IntraVENous PRN    acetaminophen (TYLENOL) tablet 650 mg  650 mg Oral Q6H PRN    Or    acetaminophen (TYLENOL) suppository 650 mg  650 mg Rectal Q6H PRN    polyethylene glycol (MIRALAX) packet 17 g  17 g Oral DAILY PRN    ondansetron (ZOFRAN ODT) tablet 4 mg  4 mg Oral Q8H PRN    Or    ondansetron (ZOFRAN) injection 4 mg  4 mg IntraVENous Q6H PRN    enoxaparin (LOVENOX) injection 40 mg  40 mg SubCUTAneous DAILY    oxyCODONE-acetaminophen (PERCOCET) 5-325 mg per tablet 1 Tablet  1 Tablet Oral Q4H PRN    naloxone (NARCAN) injection 0.4 mg  0.4 mg IntraVENous PRN    traMADoL (ULTRAM) tablet 50 mg  50 mg Oral Q6H PRN    polyethylene glycol (MIRALAX) packet 17 g  17 g Oral DAILY    senna (SENOKOT) tablet 8.6 mg  1 Tablet Oral DAILY    metoprolol succinate (TOPROL-XL) XL tablet 25 mg  25 mg Oral DAILY    [START ON 10/9/2022] bumetanide (BUMEX) tablet 1 mg  1 mg Oral DAILY    magnesium hydroxide (MILK OF MAGNESIA) 400 mg/5 mL oral suspension 30 mL  30 mL Oral ONCE    mylanta/viscous lidocaine (GI COCKTAIL)  40 mL Oral ONCE    lidocaine 4 % patch 2 Patch  2 Patch TransDERmal Q24H     ______________________________________________________________________  EXPECTED LENGTH OF STAY: - - -  ACTUAL LENGTH OF STAY:          1                 Charleen Mart MD

## 2022-10-08 NOTE — PROGRESS NOTES
Problem: Falls - Risk of  Goal: *Absence of Falls  Description: Document Thoreau Fall Risk and appropriate interventions in the flowsheet.   10/8/2022 1904 by Simba Toney RN  Outcome: Progressing Towards Goal  Note: Fall Risk Interventions:                             10/8/2022 1845 by Simba Toney RN  Outcome: Progressing Towards Goal  Note: Fall Risk Interventions:                                Problem: Patient Education: Go to Patient Education Activity  Goal: Patient/Family Education  10/8/2022 1904 by Simba Toney RN  Outcome: Progressing Towards Goal  10/8/2022 1845 by Simba Toney RN  Outcome: Progressing Towards Goal     Problem: Pain  Goal: *Control of Pain  10/8/2022 1904 by Simba Toney RN  Outcome: Progressing Towards Goal  10/8/2022 1845 by Simba Toney RN  Outcome: Progressing Towards Goal  Goal: *PALLIATIVE CARE:  Alleviation of Pain  10/8/2022 1904 by Simba Toney RN  Outcome: Progressing Towards Goal  10/8/2022 1845 by Simba Toney RN  Outcome: Progressing Towards Goal

## 2022-10-08 NOTE — CONSULTS
[facility]  Cardiology Care Note                  [x]Initial visit     []Established visit     Patient Name: Amy López - HQI:[birth year] - BLQ:[id number]  Primary Cardiologist: [address] [place] [address] cardiology       Reason for initial visit:  Chronic diastolic CHF  HPI:     Ms. Rachael Vicente is a 55-year-old female followed at [address] [place] [address] with a history of heart failure preserved EF, RV dysfunction with pulmonary hypertension, known severe TR, moderate MR and bioprosthetic aortic stenosis status post aortic valve replacement, Amplatzer VSD occluder device, paroxysmal atrial flutter status post cardioversions, cardiac cath in October 2021 with no significant CAD, history of TIA, pacemaker, sleep apnea admitted with abdominal pain. SUBJECTIVE:    She says this abdominal pain has been happening ever since she started Jardiance and she ended up stopping this on her own about a week ago. She describes the pain is persistent left sided at times also associated with back pain. She says that she has been constipated ever since starting the [place] Edinger as well. She denies any chest pain. She has baseline shortness of breath but this is unchanged. Her weights also been unchanged. She has been compliant with her other cardiac meds. Assessment and Plan     Chronic diastolic CHF [place] heart association class II-III. Overall she is compensated and her weight is at her normal baseline. Would continue her current cardiac regiment except with stay off of the Jardiance. Needs close follow-up outpatient with her her cardiologist at [address] [place] [address]. No additional cardiac evaluation indicated at this time and she can have the echo outpatient if she is otherwise ready for hospital discharge. Will be available prn. Abdominal pain. Unclear etiology and GIs been consulted. Patient has had constipation. Had a abdominal CT performed.   VSD closure  Paroxysmal aflutter, pacemaker. HR is paced. Had WOLFGANG closure therefore not on [facility]             ____________________________________________________________    Cardiac testing  09/29/19    ECHO ADULT COMPLETE 09/30/2019 9/30/2019    Interpretation Summary  · Left Ventricle: Normal cavity size, wall thickness, systolic function (ejection fraction normal) and diastolic function. Estimated left ventricular ejection fraction is 56 - 60%. · Right Ventricle: Not well visualized. Severely dilated right ventricle. Pacing wire present  · Left Atrium: Moderately dilated left atrium. · Right Atrium: Moderately dilated right atrium. · Interatrial Septum: Agitated saline contrast study was performed. There was a right to left shunt in the baseline state. There was a right to left shunt, with provocative maneuvers to increase right atrial pressure. · Aortic Valve: Prosthetic aortic valve. Mild aortic valve stenosis is present. Prosthesis is normal. Prosthetic valve function is sufficient  · Tricuspid Valve: Moderate to severe tricuspid valve regurgitation is present. · Mitral Valve: Mitral valve thickening. Mild mitral annular calcification. Mild mitral valve regurgitation is present. · Pulmonic Valve: Mild pulmonic valve regurgitation is present. · Pulmonary Artery: Severe pulmonary hypertension. · Interatrial Septum: Agitated saline contrast study was performed. · At aortic annulus an abnormal man-made dilated structure is seen extending into the RV cavity. Patient has a porcine AVR and a pacer from Dr Ho Spaulding, suggest GALINDO be done soon.     Needs GALINDO    Signed by: Gia Price MD on 9/30/2019  1:32 PM                Most recent HS troponins:  Recent Labs     10/08/22  0540 10/07/22  1158 10/07/22  0906   TROPHS 21 19 15     ECG: Paced rhythm  Review of Systems    [x]All other systems reviewed and all negative except as written in HPI    [] Patient unable to provide secondary to condition         Past Medical History:   Diagnosis Date    Aortic stenosis 10/27/2010    Aortic valve replaced 10/27/2010    Atrioventricular block, complete ([facility] [address] 10/27/2010    CAD (coronary artery disease)     Cancer ([facility] [address] 2004    h/o breast cancer    Cerebral artery occlusion with cerebral infarction [facility])     Depression     Essential hypertension     Fatigue 10/27/2010    HTN (hypertension) 10/27/2010    S/P cardiac pacemaker procedure 10/27/2010    Unspecified sleep apnea     uses CPAP     Past Surgical History:   Procedure Laterality Date    ABDOMEN SURGERY PROC UNLISTED      tummy tuck    BREAST SURGERY PROCEDURE UNLISTED      mastectomy    HX HEART CATHETERIZATION      HX HEART VALVE SURGERY  3/2010    porcine AVR    HX HERNIA REPAIR      HX PACEMAKER  3/24/2010    [facility] [address], complete heart block    HX TONSILLECTOMY       Social Hx:  reports that she has never smoked. She has never used smokeless tobacco. She reports current alcohol use of about 1.0 standard drink per week. She reports that she does not use drugs. Family Hx: family history is not on file. Allergies   Allergen Reactions    Tetracycline Hives          OBJECTIVE:  Wt Readings from Last 3 Encounters:   10/07/22 161 lb 13.1 oz (73.4 kg)   10/11/19 168 lb (76.2 kg)   10/01/19 177 lb 12.8 oz (80.6 kg)     No intake or output data in the 24 hours ending 10/08/22 1114      Physical Exam    Vitals:   Vitals:    10/08/22 0817 10/08/22 0900 10/08/22 0922 10/08/22 1000   BP: 108/70      Pulse: 76 76  77   Resp: 17      Temp: 98.1 °F (36.7 °C)      SpO2: 97%      Weight:       Height:   5' 5\" (1.651 m)      Telemetry: paced      General:    Alert, cooperative, no distress, appears stated age. Neck:   Supple, no carotid bruit and no JVD. Back:     Symmetric,    Lungs:     Clear to auscultation bilaterally. Heart[de-identified]    Regular rate and rhythm, S1, S2 normal, no murmur, click, rub or gallop. Abdomen:     Deferred, +abdominal pain   Extremities:   Extremities normal, atraumatic, no cyanosis. Trace LE edema. Vascular:   Pulses -normal   Skin:   Skin color normal. No rashes or lesions on visible areas   Neurologic:   Alert, Moves all extremities. Data Review:     Radiology:   XR Results (most recent):  Results from Hospital Encounter encounter on 10/07/22    XR CHEST PA LAT    Narrative  INDICATION:  hx of CHF    EXAM: Chest 2 views. Comparison:9/9/2020    Findings: Cardiac silhouette is enlarged. Multilead right chest pacer unchanged. Patient is status post median sternotomy. Pulmonary vasculature is not engorged. There are no focal parenchymal opacities, effusions, or pneumothorax. Impression  1. Enlarged cardiac silhouette, otherwise no acute disease    CT Results (most recent):  Results from Hospital Encounter encounter on 10/07/22    CT ABD PELV W CONT    Narrative  EXAM: CT ABD PELV W CONT    INDICATION: Severe lower abdominal pain for the last 24 hours, low back pain,  minimal bowel movements for the last several months    COMPARISON: None    CONTRAST: 100 mL of Isovue-370. ORAL CONTRAST: None    TECHNIQUE:  Following the uneventful intravenous administration of contrast, thin axial  images were obtained through the abdomen and pelvis. Coronal and sagittal  reconstructions were generated. CT dose reduction was achieved through use of a  standardized protocol tailored for this examination and automatic exposure  control for dose modulation. FINDINGS:  LOWER THORAX: No consolidation. Heart is enlarged  LIVER: Equivocal nodularity without focal mass  BILIARY TREE: Gallbladder is within normal limits. CBD is not dilated. SPLEEN: Not enlarged. Subcentimeter hypervascular lesion near the superior  lateral margin of the spleen is of uncertain etiology but of doubtful clinical  significance  PANCREAS: No mass or ductal dilatation. ADRENALS: Indeterminant 18 mm right adrenal nodule. It measures 89 Hounsfield  units.  Slight lobulation of the left adrenal gland without discrete nodule  KIDNEYS: No mass, calculus, or hydronephrosis. STOMACH: Unremarkable. SMALL BOWEL: No dilatation or wall thickening. COLON: No dilatation or wall thickening. Left-sided diverticulosis. No acute  diverticulitis  APPENDIX: Normal  PERITONEUM: No ascites or pneumoperitoneum. RETROPERITONEUM: No lymphadenopathy or aortic aneurysm. There are vascular  calcifications  REPRODUCTIVE ORGANS: Not enlarged  URINARY BLADDER: No mass or calculus. BONES: No destructive bone lesion. Bones are osteopenic with multilevel  degenerative change  ABDOMINAL WALL: No mass or hernia. ADDITIONAL COMMENTS: N/A    Impression  1. Diverticulosis of the left colon. No acute diverticulitis    MRI Results (most recent):  No results found for this or any previous visit. No results for input(s): CPK, TROIQ in the last 72 hours. No lab exists for component: CKQMB, CPKMB, BMPP  Recent Labs     10/08/22  0540 10/07/22  0906   * 134*   K 4.4 4.9   CL 99 97   CO2 29 30   BUN 15 12   CREA 0.84 0.79   GLU 90 142*   PHOS 3.9  --    CA 9.8 10.3*     Recent Labs     10/07/22  0906   WBC 10.0   HGB 11.7   HCT 35.7        Recent Labs     10/08/22  0540 10/07/22  0906   AP 71 78     No results for input(s): CHOL, LDLC in the last 72 hours.     No lab exists for component: TGL, HDLC,  HBA1C  Recent Labs     10/08/22  0540   TSH 0.83           Current meds:    Current Facility-Administered Medications:     aspirin chewable tablet 81 mg, 81 mg, Oral, DAILY, Noy Oliveira MD    atorvastatin (LIPITOR) tablet 40 mg, 40 mg, Oral, DAILY, Noy Oliveira MD    .PHARMACY TO SUBSTITUTE PER PROTOCOL (Reordered from: bisoprolol (ZEBETA) 5 mg tablet), , , Per Protocol, Noy Oliveira MD    bumetanide (BUMEX) injection 1 mg, 1 mg, IntraVENous, BID, Noy Oliveira MD    dicyclomine (BENTYL) capsule 10 mg, 10 mg, Oral, PRN, Noy Oliveira MD    losartan (COZAAR) tablet 25 mg, 25 mg, Oral, DAILY, Roxanne, Razaak A, MD    traZODone (DESYREL) tablet 50 mg, 50 mg, Oral, QHS PRN, Noy Oliveira MD    verapamiL (CALAN) tablet 120 mg, 120 mg, Oral, DAILY WITH BREAKFAST, Noy Oliveira MD    verapamil ER (CALAN-SR) tablet 240 mg, 240 mg, Oral, QHS, Noy Oliveira MD    sodium chloride (NS) flush 5-40 mL, 5-40 mL, IntraVENous, Q8H, Noy Oliveira MD, 5 mL at 10/08/22 0600    sodium chloride (NS) flush 5-40 mL, 5-40 mL, IntraVENous, PRN, Noy Oliveira MD    acetaminophen (TYLENOL) tablet 650 mg, 650 mg, Oral, Q6H PRN **OR** acetaminophen (TYLENOL) suppository 650 mg, 650 mg, Rectal, Q6H PRN, Noy Oliveira MD    polyethylene glycol (MIRALAX) packet 17 g, 17 g, Oral, DAILY PRN, Noy Oliveira MD    ondansetron (ZOFRAN ODT) tablet 4 mg, 4 mg, Oral, Q8H PRN **OR** ondansetron (ZOFRAN) injection 4 mg, 4 mg, IntraVENous, Q6H PRN, Noy Oliveira MD    enoxaparin (LOVENOX) injection 40 mg, 40 mg, SubCUTAneous, DAILY, Noy Oliveira MD, 40 mg at 10/08/22 1110    oxyCODONE-acetaminophen (PERCOCET) 5-325 mg per tablet 1 Tablet, 1 Tablet, Oral, Q4H PRN, Noy Oliveira MD, 1 Tablet at 10/08/22 1109    naloxone (NARCAN) injection 0.4 mg, 0.4 mg, IntraVENous, PRN, Noy Oliveira MD    traMADoL (ULTRAM) tablet 50 mg, 50 mg, Oral, Q6H PRN, Peterson Lutz MD    polyethylene glycol (MIRALAX) packet 17 g, 17 g, Oral, DAILY, Nydia Collet, Anitha, MD    senna (SENOKOT) tablet 8.6 mg, 1 Tablet, Oral, DAILY, Ivelisse Duncan MD    metoprolol succinate (TOPROL-XL) XL tablet 25 mg, 25 mg, Oral, DAILY, Peterson Lutz MD    lidocaine 4 % patch 2 Patch, 2 Patch, TransDERmal, Q24H, Noy Oliveira MD, 2 Patch at 10/07/22 [id number] E Richey, MD  [facility] [address] [place] [address] 13, [address] [address],[address] [place], [place] [address]Kristal jarvisSaint Luke's Hospital  [phone]      Eunice Dobbins MD

## 2022-10-09 ENCOUNTER — APPOINTMENT (OUTPATIENT)
Dept: GENERAL RADIOLOGY | Age: 77
DRG: 391 | End: 2022-10-09
Attending: STUDENT IN AN ORGANIZED HEALTH CARE EDUCATION/TRAINING PROGRAM
Payer: MEDICARE

## 2022-10-09 PROBLEM — M19.90 ARTHRITIS: Status: ACTIVE | Noted: 2022-10-09

## 2022-10-09 PROBLEM — M19.90 ARTHRITIS: Chronic | Status: ACTIVE | Noted: 2022-10-09

## 2022-10-09 LAB
ANION GAP SERPL CALC-SCNC: 4 MMOL/L (ref 5–15)
BASOPHILS # BLD: 0 K/UL (ref 0–0.1)
BASOPHILS NFR BLD: 1 % (ref 0–1)
BUN SERPL-MCNC: 19 MG/DL (ref 6–20)
BUN/CREAT SERPL: 22 (ref 12–20)
CALCIUM SERPL-MCNC: 9.6 MG/DL (ref 8.5–10.1)
CHLORIDE SERPL-SCNC: 97 MMOL/L (ref 97–108)
CO2 SERPL-SCNC: 31 MMOL/L (ref 21–32)
CREAT SERPL-MCNC: 0.87 MG/DL (ref 0.55–1.02)
DIFFERENTIAL METHOD BLD: ABNORMAL
EOSINOPHIL # BLD: 0.1 K/UL (ref 0–0.4)
EOSINOPHIL NFR BLD: 1 % (ref 0–7)
ERYTHROCYTE [DISTWIDTH] IN BLOOD BY AUTOMATED COUNT: 12.8 % (ref 11.5–14.5)
GLUCOSE SERPL-MCNC: 118 MG/DL (ref 65–100)
HCT VFR BLD AUTO: 33.5 % (ref 35–47)
HGB BLD-MCNC: 11 G/DL (ref 11.5–16)
IMM GRANULOCYTES # BLD AUTO: 0.1 K/UL (ref 0–0.04)
IMM GRANULOCYTES NFR BLD AUTO: 1 % (ref 0–0.5)
LYMPHOCYTES # BLD: 1 K/UL (ref 0.8–3.5)
LYMPHOCYTES NFR BLD: 11 % (ref 12–49)
MCH RBC QN AUTO: 30.9 PG (ref 26–34)
MCHC RBC AUTO-ENTMCNC: 32.8 G/DL (ref 30–36.5)
MCV RBC AUTO: 94.1 FL (ref 80–99)
MONOCYTES # BLD: 1.2 K/UL (ref 0–1)
MONOCYTES NFR BLD: 13 % (ref 5–13)
NEUTS SEG # BLD: 6.5 K/UL (ref 1.8–8)
NEUTS SEG NFR BLD: 73 % (ref 32–75)
NRBC # BLD: 0 K/UL (ref 0–0.01)
NRBC BLD-RTO: 0 PER 100 WBC
PLATELET # BLD AUTO: 253 K/UL (ref 150–400)
PMV BLD AUTO: 10.2 FL (ref 8.9–12.9)
POTASSIUM SERPL-SCNC: 4.6 MMOL/L (ref 3.5–5.1)
RBC # BLD AUTO: 3.56 M/UL (ref 3.8–5.2)
SODIUM SERPL-SCNC: 132 MMOL/L (ref 136–145)
WBC # BLD AUTO: 8.9 K/UL (ref 3.6–11)

## 2022-10-09 PROCEDURE — 36415 COLL VENOUS BLD VENIPUNCTURE: CPT

## 2022-10-09 PROCEDURE — 74018 RADEX ABDOMEN 1 VIEW: CPT

## 2022-10-09 PROCEDURE — 51798 US URINE CAPACITY MEASURE: CPT

## 2022-10-09 PROCEDURE — 65270000032 HC RM SEMIPRIVATE

## 2022-10-09 PROCEDURE — 74011250637 HC RX REV CODE- 250/637: Performed by: STUDENT IN AN ORGANIZED HEALTH CARE EDUCATION/TRAINING PROGRAM

## 2022-10-09 PROCEDURE — 97161 PT EVAL LOW COMPLEX 20 MIN: CPT

## 2022-10-09 PROCEDURE — 74011000250 HC RX REV CODE- 250: Performed by: INTERNAL MEDICINE

## 2022-10-09 PROCEDURE — 74011250636 HC RX REV CODE- 250/636: Performed by: INTERNAL MEDICINE

## 2022-10-09 PROCEDURE — 85025 COMPLETE CBC W/AUTO DIFF WBC: CPT

## 2022-10-09 PROCEDURE — 72100 X-RAY EXAM L-S SPINE 2/3 VWS: CPT

## 2022-10-09 PROCEDURE — 74011250637 HC RX REV CODE- 250/637: Performed by: INTERNAL MEDICINE

## 2022-10-09 PROCEDURE — 80048 BASIC METABOLIC PNL TOTAL CA: CPT

## 2022-10-09 PROCEDURE — 97165 OT EVAL LOW COMPLEX 30 MIN: CPT

## 2022-10-09 RX ORDER — METOPROLOL SUCCINATE 25 MG/1
25 TABLET, EXTENDED RELEASE ORAL DAILY
Qty: 30 TABLET | Refills: 0 | Status: SHIPPED | OUTPATIENT
Start: 2022-10-09 | End: 2022-11-08

## 2022-10-09 RX ORDER — SENNOSIDES 8.6 MG/1
1 TABLET ORAL 2 TIMES DAILY
Status: DISCONTINUED | OUTPATIENT
Start: 2022-10-09 | End: 2022-10-10 | Stop reason: HOSPADM

## 2022-10-09 RX ORDER — CYCLOBENZAPRINE HCL 10 MG
10 TABLET ORAL
Status: DISCONTINUED | OUTPATIENT
Start: 2022-10-09 | End: 2022-10-10 | Stop reason: HOSPADM

## 2022-10-09 RX ORDER — TRAMADOL HYDROCHLORIDE 50 MG/1
50 TABLET ORAL
Qty: 9 TABLET | Refills: 0 | Status: SHIPPED | OUTPATIENT
Start: 2022-10-09 | End: 2022-10-10

## 2022-10-09 RX ORDER — FACIAL-BODY WIPES
10 EACH TOPICAL DAILY
Status: DISCONTINUED | OUTPATIENT
Start: 2022-10-10 | End: 2022-10-10 | Stop reason: HOSPADM

## 2022-10-09 RX ADMIN — SODIUM CHLORIDE, PRESERVATIVE FREE 10 ML: 5 INJECTION INTRAVENOUS at 14:11

## 2022-10-09 RX ADMIN — CYCLOBENZAPRINE 10 MG: 10 TABLET, FILM COATED ORAL at 10:05

## 2022-10-09 RX ADMIN — BUMETANIDE 1 MG: 1 TABLET ORAL at 08:51

## 2022-10-09 RX ADMIN — ATORVASTATIN CALCIUM 40 MG: 40 TABLET, FILM COATED ORAL at 08:52

## 2022-10-09 RX ADMIN — ENOXAPARIN SODIUM 40 MG: 100 INJECTION SUBCUTANEOUS at 08:52

## 2022-10-09 RX ADMIN — SENNOSIDES 8.6 MG: 8.6 TABLET, FILM COATED ORAL at 08:52

## 2022-10-09 RX ADMIN — SENNOSIDES 8.6 MG: 8.6 TABLET, FILM COATED ORAL at 16:12

## 2022-10-09 RX ADMIN — TRAMADOL HYDROCHLORIDE 50 MG: 50 TABLET, COATED ORAL at 12:11

## 2022-10-09 RX ADMIN — TRAMADOL HYDROCHLORIDE 50 MG: 50 TABLET, COATED ORAL at 18:52

## 2022-10-09 RX ADMIN — TRAZODONE HYDROCHLORIDE 50 MG: 50 TABLET ORAL at 01:43

## 2022-10-09 RX ADMIN — METOPROLOL SUCCINATE 25 MG: 25 TABLET, EXTENDED RELEASE ORAL at 08:51

## 2022-10-09 RX ADMIN — LACTULOSE 45 ML: 20 SOLUTION ORAL at 14:08

## 2022-10-09 RX ADMIN — LACTULOSE 15 ML: 20 SOLUTION ORAL at 18:52

## 2022-10-09 RX ADMIN — SODIUM CHLORIDE, PRESERVATIVE FREE 10 ML: 5 INJECTION INTRAVENOUS at 21:40

## 2022-10-09 RX ADMIN — POLYETHYLENE GLYCOL 3350 17 G: 17 POWDER, FOR SOLUTION ORAL at 08:51

## 2022-10-09 RX ADMIN — TRAMADOL HYDROCHLORIDE 50 MG: 50 TABLET, COATED ORAL at 05:37

## 2022-10-09 NOTE — PROGRESS NOTES
Spiritual Care Assessment/Progress Note  [facility]      NAME: Nikki Wilkerson      MRN: [id number]  AGE: 64 y.o.  SEX: female  Jew Affiliation: No Zoroastrian   Language: English     10/9/2022     Total Time (in minutes): 45     Spiritual Assessment begun in [facility] 5W1 ORTHO SPINE through conversation with:         [x]Patient        [] Family    [] Friend(s)        Reason for Consult: Advance medical directive consult     Spiritual beliefs: (Please include comment if needed)     [x] Identifies with a nani tradition: Congregation       [] Supported by a nani community:            [] Claims no spiritual orientation:           [] Seeking spiritual identity:                [] Adheres to an individual form of spirituality:           [] Not able to assess:                           Identified resources for coping:      [x] Prayer                               [] Music                  [] Guided Imagery     [x] Family/friends                 [] Pet visits     [] Devotional reading                         [] Unknown     [] Other:                                               Interventions offered during this visit: (See comments for more details)    Patient Interventions: Advance medical directive consult, Affirmation of nani, Affirmation of emotions/emotional suffering           Plan of Care:     [] Support spiritual and/or cultural needs    [] Support AMD and/or advance care planning process      [] Support grieving process   [] Coordinate Rites and/or Rituals    [] Coordination with community clergy   [] No spiritual needs identified at this time   [] Detailed Plan of Care below (See Comments)  [] Make referral to Music Therapy  [] Make referral to Pet Therapy     [] Make referral to Addiction services  [] Make referral to [facility]  [] Make referral to Spiritual Care Partner  [] No future visits requested        [x] Follow up visits as needed     Visited patient in response to a spiritual care order request to assist with an Advance Medical Directive (AMD). Explained each section of the document to patient, allowing opportunity for questions and discussion. She expressed a desire to complete an AMD but first wishes to talk with the one she wishes to designate as MPOA.  Left a blank AMD with her and encouraged her to have a  paged when she is ready to complete an AMD.  Chaplain Weber MDiv, MS, [facility]

## 2022-10-09 NOTE — PROGRESS NOTES
CALEB: anticipate d/c home with spouse assistance Vs home with home health pending clinical progression & pt/ot recommendations; Follow up with PCP & Specialist    Pt is currently on 3L/NC 02, may need new home 02 pending weaning tolerance    Family transport    RUR: 9%  Chronic HFpEF  Abdominal pain likely constipation related  Chronic osteoarthritis  Status post pacemaker  AS s/p TAVR  PT/OT recs. Pending     Reason for Admission:    Abdominal pain  History of:  congestive heart failure, dyslipidemia, hypertension, aortic stenosis; status post aortic valve replacement with bioprosthetic, obstructive sleep apnea, status post pacemaker insertion secondary to AV block                    RUR Score:    9%                 Plan for utilizing home health:      TBD    PCP: First and Last name:  Eladio Esquivel MD     Name of Practice:    Are you a current patient: Yes/No: YES   Approximate date of last visit: within last three months   Can you participate in a virtual visit with your PCP:                   YES  Current Advanced Directive/Advance Care Plan: Full Code      Healthcare Decision Maker:   Click here to complete [address] including selection of the [address] Relationship (ie \"Primary\")           Khadra Figueroa [phone]                  Transition of Care Plan:                    0900-CM reviewed pt chart & noted d/c order in place. CM met with pt at bedside and was approached by Attending who advised that d/c will be held due pt's lower back/abdominal pain and pt/ot recs. pending. Attending did advise pt may d/c soon pending therapy recs and xray result. Pt resides with spouse in a two story home with five steps to entrance and 1st floor bedroom/bathroom. Prior to admission, pt was independent with adls/iadls with occasional use of a cane. DME: cane, RW(does not use), CPAP machine. Pt uses [address] for meds with no copay concerns.  CM confirmed pcp/demographics/insurance with pt. Pt denies prior HH/IPR/SNF, however endorses prior OP PT. Family to provide d/c transport. CM to follow. Radha Ram RN BSN CCM Medicare pt has received, reviewed, and signed 2nd IM letter informing them of their right to appeal the discharge. Signed copy has been placed on pt bedside chart. Care Management Interventions  PCP Verified by CM: Yes  Mode of Transport at Discharge:  Other (see comment) (family)  Transition of Care Consult (CM Consult): Discharge Planning  Discharge Durable Medical Equipment: No  Health Maintenance Reviewed: Yes  Physical Therapy Consult: Yes  Occupational Therapy Consult: Yes  Support Systems: Spouse/Significant Other, Child(opal)  Confirm Follow Up Transport: Family  Discharge Location  Patient Expects to be Discharged to[de-identified] Home with family assistance

## 2022-10-09 NOTE — PROGRESS NOTES
[facility] Adult  Hospitalist Group                                                                                          Hospitalist Progress Note  Angelika Burton MD  Answering service: [phone] OR [phone] from in house phone        Date of Service:  10/9/2022  NAME:  Erica Ta  :  1945  MRN:  [id number]      Admission Summary:    HPI: \"64year-old woman with past medical history significant for congestive heart failure, dyslipidemia, hypertension, aortic stenosis; status post aortic valve replacement with bioprosthetic, obstructive sleep apnea, status post pacemaker insertion secondary to AV block presented at the [facility] emergency room at [place] because of abdominal pain. The pain started a few days ago. The pain is constant dull ache with radiation to the back, 9/10 in severity. No known aggravating or relieving factors. The patient stated that she was recently diagnosed with congestive heart failure and her cardiologist started her on Jardiance. The patient believed that the [place] is what is causing her symptoms. She talked to her cardiologist, the dosage was reduced, and despite the reduced dosage, the patient continued to experience body aches and pain. She stopped the medication on her own. When the patient arrived at the emergency room, the patient was found to be hypoxic. She was placed on supplemental oxygen. Her BNP level was markedly elevated. She was referred to the hospitalist service for admission. She was last admitted to the hospital from 2019 to 10/01/2019. The patient was admitted for evaluation and treatment of acute CVA. \"       Interval history / Subjective:   Patient seen examined at bedside earlier.   Still complaining abdominal pain has not had a bowel movement in the last 3 days     Assessment & Plan:     Chronic HFpEF  Abdominal pain 2/2 constipation   Chronic osteoarthritis  Status post pacemaker  AS s/p TAVR  -- Abdominal pain is likely from constipation and/or chronic osteoarthritis  -X ray lumbar spine neg, KUB shows large stool burden  -bladder scanned 2x not retaining   -Trial lactulose std, std miralax senokot, enema   - As needed pain control patient will need to be evaluated outpatient for long-term evaluation and management of her osteoarthritis  - GI cocktail, would recommend caution with opioids as can worsen constipation  - Greatly appreciate cardiology evaluation, stable for discharge from their standpoint resume home medications can follow-up outpatient with [facility] cardiology and get her echo done outpatient canceled here  -Stay off Jardiance     PT OT evaluation     Plan to discharge in the next 24 hours pending bm      Code status: Full  Prophylaxis: Lovenox  Care Plan discussed with: Patient/family, nurse, case management  Anticipated Disposition: 24 hours home pending       Hospital Problems  Date Reviewed: 10/8/2022            Codes Class Noted POA    Arthritis (Chronic) ICD-10-CM: M19.90  ICD-9-CM: 716.90  10/9/2022 Unknown        * (Principal) Acute on chronic heart failure with preserved ejection fraction ([facility] ICD-10-CM: I50.33  ICD-9-CM: 428.23  10/8/2022 Yes        Respiratory failure ([facility] ICD-10-CM: J96.90  ICD-9-CM: 518.81  10/7/2022 Unknown         Review of Systems:   A comprehensive review of systems was negative except for that written in the HPI. Vital Signs:    Last 24hrs VS reviewed since prior progress note.  Most recent are:  Visit Vitals  BP (!) 164/79 (BP 1 Location: Left upper arm, BP Patient Position: At rest;Supine)   Pulse 77   Temp 98.9 °F (37.2 °C)   Resp 16   Ht 5' 5\" (1.651 m)   Wt 76.1 kg (167 lb 12.3 oz)   SpO2 93%   BMI 27.92 kg/m²       No intake or output data in the 24 hours ending 10/09/22 1423     Physical Examination:     I had a face to face encounter with this patient and independently examined them on 10/9/2022 as outlined below:          Constitutional: No acute distress, cooperative, pleasant    ENT:  Oral mucosa moist, oropharynx benign. Resp:  CTA bilaterally. No wheezing/rhonchi/rales. No accessory muscle use. CV:  Regular rhythm, normal rate, no murmurs, gallops, rubs    GI:  Soft, non distended, non tender. normoactive bowel sounds, no hepatosplenomegaly     Musculoskeletal:  No edema, warm, 2+ pulses throughout    Neurologic:  Moves all extremities. AAOx3, CN II-XII reviewed            Data Review:    Review and/or order of clinical lab test  Review and/or order of tests in the radiology section of CPT  Review and/or order of tests in the medicine section of CPT      Labs:     Recent Labs     10/09/22  0539 10/07/22  0906   WBC 8.9 10.0   HGB 11.0* 11.7   HCT 33.5* 35.7    265       Recent Labs     10/09/22  0539 10/08/22  0540 10/07/22  0906   * 135* 134*   K 4.6 4.4 4.9   CL 97 99 97   CO2 31 29 30   BUN 19 15 12   CREA 0.87 0.84 0.79   * 90 142*   CA 9.6 9.8 10.3*   MG  --  2.4 2.4   PHOS  --  3.9  --        Recent Labs     10/08/22  0540 10/07/22  0906   ALT 18 22   AP 71 78   TBILI 0.8 0.8   TP 6.0* 7.0   ALB 2.5* 3.0*   GLOB 3.5 4.0   LPSE 92 95       No results for input(s): INR, PTP, APTT, INREXT, INREXT in the last 72 hours. No results for input(s): FE, TIBC, PSAT, FERR in the last 72 hours. No results found for: FOL, RBCF   No results for input(s): PH, PCO2, PO2 in the last 72 hours. No results for input(s): CPK, CKNDX, TROIQ in the last 72 hours.     No lab exists for component: CPKMB  Lab Results   Component Value Date/Time    Cholesterol, total 167 09/30/2019 01:51 AM    HDL Cholesterol 68 09/30/2019 01:51 AM    LDL, calculated 89 09/30/2019 01:51 AM    Triglyceride 50 09/30/2019 01:51 AM    CHOL/HDL Ratio 2.5 09/30/2019 01:51 AM     Lab Results   Component Value Date/Time    Glucose (POC) 117 (H) 09/29/2019 07:58 PM    Glucose (POC) 132 (H) 03/26/2010 06:37 AM    Glucose (POC) 149 (H) 03/25/2010 10:03 PM Glucose (POC) 176 (H) 03/25/2010 07:43 PM    Glucose (POC) 130 (H) 03/25/2010 01:20 PM     Lab Results   Component Value Date/Time    Color YELLOW/STRAW 10/07/2022 11:45 AM    Appearance CLEAR 10/07/2022 11:45 AM    Specific gravity 1.010 10/07/2022 11:45 AM    Specific gravity 1.027 04/01/2010 06:00 PM    pH (UA) 7.5 10/07/2022 11:45 AM    Protein TRACE (A) 10/07/2022 11:45 AM    Glucose 500 (A) 10/07/2022 11:45 AM    Ketone Negative 10/07/2022 11:45 AM    Bilirubin Negative 10/07/2022 11:45 AM    Urobilinogen 4.0 (H) 10/07/2022 11:45 AM    Nitrites Negative 10/07/2022 11:45 AM    Leukocyte Esterase Negative 10/07/2022 11:45 AM    Epithelial cells FEW 10/07/2022 11:45 AM    Bacteria Negative 10/07/2022 11:45 AM    WBC 0-4 10/07/2022 11:45 AM    RBC 0-5 10/07/2022 11:45 AM         Medications Reviewed:     Current Facility-Administered Medications   Medication Dose Route Frequency    cyclobenzaprine (FLEXERIL) tablet 10 mg  10 mg Oral TID PRN    senna (SENOKOT) tablet 8.6 mg  1 Tablet Oral BID    [START ON 10/10/2022] bisacodyL (DULCOLAX) suppository 10 mg  10 mg Rectal DAILY    lactulose (CHRONULAC) 10 gram/15 mL solution 15 mL  10 g Oral BID    aspirin chewable tablet 81 mg  81 mg Oral DAILY    atorvastatin (LIPITOR) tablet 40 mg  40 mg Oral DAILY    dicyclomine (BENTYL) capsule 10 mg  10 mg Oral PRN    losartan (COZAAR) tablet 25 mg  25 mg Oral DAILY    traZODone (DESYREL) tablet 50 mg  50 mg Oral QHS PRN    sodium chloride (NS) flush 5-40 mL  5-40 mL IntraVENous Q8H    sodium chloride (NS) flush 5-40 mL  5-40 mL IntraVENous PRN    acetaminophen (TYLENOL) tablet 650 mg  650 mg Oral Q6H PRN    Or    acetaminophen (TYLENOL) suppository 650 mg  650 mg Rectal Q6H PRN    polyethylene glycol (MIRALAX) packet 17 g  17 g Oral DAILY PRN    ondansetron (ZOFRAN ODT) tablet 4 mg  4 mg Oral Q8H PRN    Or    ondansetron (ZOFRAN) injection 4 mg  4 mg IntraVENous Q6H PRN    enoxaparin (LOVENOX) injection 40 mg  40 mg SubCUTAneous DAILY    naloxone (NARCAN) injection 0.4 mg  0.4 mg IntraVENous PRN    traMADoL (ULTRAM) tablet 50 mg  50 mg Oral Q6H PRN    polyethylene glycol (MIRALAX) packet 17 g  17 g Oral DAILY    metoprolol succinate (TOPROL-XL) XL tablet 25 mg  25 mg Oral DAILY    bumetanide (BUMEX) tablet 1 mg  1 mg Oral DAILY    lidocaine 4 % patch 2 Patch  2 Patch TransDERmal Q24H     ______________________________________________________________________  EXPECTED LENGTH OF STAY: - - -  ACTUAL LENGTH OF STAY:          2                 Bebeto Domínguez MD

## 2022-10-09 NOTE — PROGRESS NOTES
Problem: Falls - Risk of  Goal: *Absence of Falls  Description: Document Pricila Nap Fall Risk and appropriate interventions in the flowsheet.   Outcome: Progressing Towards Goal  Note: Fall Risk Interventions:  Mobility Interventions: Patient to call before getting OOB         Medication Interventions: Patient to call before getting OOB    Elimination Interventions: Call light in reach, Elevated toilet seat              Problem: Patient Education: Go to Patient Education Activity  Goal: Patient/Family Education  Outcome: Progressing Towards Goal     Problem: Pain  Goal: *Control of Pain  Outcome: Progressing Towards Goal

## 2022-10-09 NOTE — ACP (ADVANCE CARE PLANNING)
Advance Care Planning    Advance Care Planning Note  Ambulatory Spiritual Care Services      Date: 10/7/2022   Received request from IDT member. Conversation participants:  Patient who understands ACP conversation    Goals of Conversation:   Discuss Advance Care planning documents  Facilitate a discussion related to patient's goals of care as they align with the patient's values and beliefs    Healthcare Decision Maker:   No healthcare decision makers have been documented. Click here to complete [address] including selection of the Healthcare Decision Maker Relationship (ie \"Primary\")  Summary:  No Decision Maker named by patient at this time    Advance Care Planning Documents (Patient Wishes) on file:  Currently on file:  None    Assessment:  Visited patient in response to a spiritual care order request to assist with an Advance Medical Directive (AMD). Explained each section of the document to patient, allowing opportunity for questions and discussion. She expressed a desire to complete an AMD but first wishes to talk with the one she wishes to designate as MPOA.  Left a blank AMD with her and encouraged her to have a  paged when she is ready to complete an AMD.    Interventions:  Requested patient/family submit existing document(s) for our records: None    Care Preferences Communicated:  No    Outcomes:  AMD not completed at this time      Patient/Healthcare Decision Maker Instructions:  Not completed    Miquel DodgeChestnut Ridge Center on 10/9/2022 at 2:12 PM

## 2022-10-09 NOTE — PROGRESS NOTES
[facility] Adult  Hospitalist Group                                                                                          Hospitalist Progress Note  Brad Tobin MD  Answering service: [phone] OR [phone] from in house phone        Date of Service:  10/9/2022  NAME:  Ginna Duque  :  1945  MRN:  [id number]      Admission Summary:    HPI: \"64year-old woman with past medical history significant for congestive heart failure, dyslipidemia, hypertension, aortic stenosis; status post aortic valve replacement with bioprosthetic, obstructive sleep apnea, status post pacemaker insertion secondary to AV block presented at the [facility] emergency room at [place] because of abdominal pain. The pain started a few days ago. The pain is constant dull ache with radiation to the back, 9/10 in severity. No known aggravating or relieving factors. The patient stated that she was recently diagnosed with congestive heart failure and her cardiologist started her on Jardiance. The patient believed that the [place] is what is causing her symptoms. She talked to her cardiologist, the dosage was reduced, and despite the reduced dosage, the patient continued to experience body aches and pain. She stopped the medication on her own. When the patient arrived at the emergency room, the patient was found to be hypoxic. She was placed on supplemental oxygen. Her BNP level was markedly elevated. She was referred to the hospitalist service for admission. She was last admitted to the hospital from 2019 to 10/01/2019. The patient was admitted for evaluation and treatment of acute CVA. \"       Interval history / Subjective:   Patient seen examined at bedside earlier.   Still complaining abdominal pain has not had a bowel movement in the last 3 days     Assessment & Plan:     Chronic HFpEF  Abdominal pain 2/2 constipation   Chronic osteoarthritis  Status post pacemaker  AS s/p TAVR  -- Abdominal pain is likely from constipation and/or chronic osteoarthritis  -X ray lumbar spine neg, KUB shows large stool burden  -Trial lactulose std, std miralax senokot, enema   - As needed pain control patient will need to be evaluated outpatient for long-term evaluation and management of her osteoarthritis  - GI cocktail, would recommend caution with opioids as can worsen constipation  - Greatly appreciate cardiology evaluation, stable for discharge from their standpoint resume home medications can follow-up outpatient with [facility] cardiology and get her echo done outpatient canceled here  -Stay off Jardiance     PT OT evaluation     Plan to discharge in the next 24 hours pending bm      Code status: Full  Prophylaxis: Lovenox  Care Plan discussed with: Patient/family, nurse, case management  Anticipated Disposition: 24 hours home pending       Hospital Problems  Date Reviewed: 10/8/2022            Codes Class Noted POA    Arthritis (Chronic) ICD-10-CM: M19.90  ICD-9-CM: 716.90  10/9/2022 Unknown        * (Principal) Acute on chronic heart failure with preserved ejection fraction ([facility] [address] ICD-10-CM: I50.33  ICD-9-CM: 428.23  10/8/2022 Yes        Respiratory failure ([facility] [address] ICD-10-CM: J96.90  ICD-9-CM: 518.81  10/7/2022 Unknown           Review of Systems:   A comprehensive review of systems was negative except for that written in the HPI. Vital Signs:    Last 24hrs VS reviewed since prior progress note.  Most recent are:  Visit Vitals  BP (!) 161/90 (BP 1 Location: Left upper arm, BP Patient Position: At rest;Lying)   Pulse 72   Temp 98.5 °F (36.9 °C)   Resp 18   Ht 5' 5\" (1.651 m)   Wt 76.1 kg (167 lb 12.3 oz)   SpO2 93%   BMI 27.92 kg/m²       No intake or output data in the 24 hours ending 10/09/22 1421     Physical Examination:     I had a face to face encounter with this patient and independently examined them on 10/9/2022 as outlined below:          Constitutional:  No acute distress, cooperative, pleasant    ENT:  Oral mucosa moist, oropharynx benign. Resp:  CTA bilaterally. No wheezing/rhonchi/rales. No accessory muscle use. CV:  Regular rhythm, normal rate, no murmurs, gallops, rubs    GI:  Soft, non distended, non tender. normoactive bowel sounds, no hepatosplenomegaly     Musculoskeletal:  No edema, warm, 2+ pulses throughout    Neurologic:  Moves all extremities. AAOx3, CN II-XII reviewed            Data Review:    Review and/or order of clinical lab test  Review and/or order of tests in the radiology section of CPT  Review and/or order of tests in the medicine section of CPT      Labs:     Recent Labs     10/09/22  0539 10/07/22  0906   WBC 8.9 10.0   HGB 11.0* 11.7   HCT 33.5* 35.7    265       Recent Labs     10/09/22  0539 10/08/22  0540 10/07/22  0906   * 135* 134*   K 4.6 4.4 4.9   CL 97 99 97   CO2 31 29 30   BUN 19 15 12   CREA 0.87 0.84 0.79   * 90 142*   CA 9.6 9.8 10.3*   MG  --  2.4 2.4   PHOS  --  3.9  --        Recent Labs     10/08/22  0540 10/07/22  0906   ALT 18 22   AP 71 78   TBILI 0.8 0.8   TP 6.0* 7.0   ALB 2.5* 3.0*   GLOB 3.5 4.0   LPSE 92 95       No results for input(s): INR, PTP, APTT, INREXT, INREXT in the last 72 hours. No results for input(s): FE, TIBC, PSAT, FERR in the last 72 hours. No results found for: FOL, RBCF   No results for input(s): PH, PCO2, PO2 in the last 72 hours. No results for input(s): CPK, CKNDX, TROIQ in the last 72 hours.     No lab exists for component: CPKMB  Lab Results   Component Value Date/Time    Cholesterol, total 167 09/30/2019 01:51 AM    HDL Cholesterol 68 09/30/2019 01:51 AM    LDL, calculated 89 09/30/2019 01:51 AM    Triglyceride 50 09/30/2019 01:51 AM    CHOL/HDL Ratio 2.5 09/30/2019 01:51 AM     Lab Results   Component Value Date/Time    Glucose (POC) 117 (H) 09/29/2019 07:58 PM    Glucose (POC) 132 (H) 03/26/2010 06:37 AM    Glucose (POC) 149 (H) 03/25/2010 10:03 PM    Glucose (POC) 176 (H) 03/25/2010 07:43 PM    Glucose (POC) 130 (H) 03/25/2010 01:20 PM     Lab Results   Component Value Date/Time    Color YELLOW/STRAW 10/07/2022 11:45 AM    Appearance CLEAR 10/07/2022 11:45 AM    Specific gravity 1.010 10/07/2022 11:45 AM    Specific gravity 1.027 04/01/2010 06:00 PM    pH (UA) 7.5 10/07/2022 11:45 AM    Protein TRACE (A) 10/07/2022 11:45 AM    Glucose 500 (A) 10/07/2022 11:45 AM    Ketone Negative 10/07/2022 11:45 AM    Bilirubin Negative 10/07/2022 11:45 AM    Urobilinogen 4.0 (H) 10/07/2022 11:45 AM    Nitrites Negative 10/07/2022 11:45 AM    Leukocyte Esterase Negative 10/07/2022 11:45 AM    Epithelial cells FEW 10/07/2022 11:45 AM    Bacteria Negative 10/07/2022 11:45 AM    WBC 0-4 10/07/2022 11:45 AM    RBC 0-5 10/07/2022 11:45 AM         Medications Reviewed:     Current Facility-Administered Medications   Medication Dose Route Frequency    cyclobenzaprine (FLEXERIL) tablet 10 mg  10 mg Oral TID PRN    senna (SENOKOT) tablet 8.6 mg  1 Tablet Oral BID    [START ON 10/10/2022] bisacodyL (DULCOLAX) suppository 10 mg  10 mg Rectal DAILY    aspirin chewable tablet 81 mg  81 mg Oral DAILY    atorvastatin (LIPITOR) tablet 40 mg  40 mg Oral DAILY    dicyclomine (BENTYL) capsule 10 mg  10 mg Oral PRN    losartan (COZAAR) tablet 25 mg  25 mg Oral DAILY    traZODone (DESYREL) tablet 50 mg  50 mg Oral QHS PRN    sodium chloride (NS) flush 5-40 mL  5-40 mL IntraVENous Q8H    sodium chloride (NS) flush 5-40 mL  5-40 mL IntraVENous PRN    acetaminophen (TYLENOL) tablet 650 mg  650 mg Oral Q6H PRN    Or    acetaminophen (TYLENOL) suppository 650 mg  650 mg Rectal Q6H PRN    polyethylene glycol (MIRALAX) packet 17 g  17 g Oral DAILY PRN    ondansetron (ZOFRAN ODT) tablet 4 mg  4 mg Oral Q8H PRN    Or    ondansetron (ZOFRAN) injection 4 mg  4 mg IntraVENous Q6H PRN    enoxaparin (LOVENOX) injection 40 mg  40 mg SubCUTAneous DAILY    naloxone (NARCAN) injection 0.4 mg  0.4 mg IntraVENous PRN    traMADoL (ULTRAM) tablet 50 mg  50 mg Oral Q6H PRN    polyethylene glycol (MIRALAX) packet 17 g  17 g Oral DAILY    metoprolol succinate (TOPROL-XL) XL tablet 25 mg  25 mg Oral DAILY    bumetanide (BUMEX) tablet 1 mg  1 mg Oral DAILY    lidocaine 4 % patch 2 Patch  2 Patch TransDERmal Q24H     ______________________________________________________________________  EXPECTED LENGTH OF STAY: - - -  ACTUAL LENGTH OF STAY:          2                 Luz Max MD

## 2022-10-09 NOTE — PROGRESS NOTES
Problem: Mobility Impaired (Adult and Pediatric)  Goal: *Acute Goals and Plan of Care (Insert Text)  Description: FUNCTIONAL STATUS PRIOR TO ADMISSION: Patient was independent and active without use of DME. Owns a RW from a previous surgery years ago, but does not use. HOME SUPPORT PRIOR TO ADMISSION: The patient lived with spouse but did not require assist.    Physical Therapy Goals  Initiated 10/9/2022  1. Patient will move from supine to sit and sit to supine  in bed with modified independence within 7 day(s). 2.  Patient will transfer from bed to chair and chair to bed with modified independence using the least restrictive device within 7 day(s). 3.  Patient will perform sit to stand with modified independence within 7 day(s). 4.  Patient will ambulate with modified independence for 150 feet with the least restrictive device within 7 day(s). 5.  Patient will ascend/descend 5 stairs with 1 handrail(s) with modified independence within 7 day(s). Outcome: Progressing Towards Goal     PHYSICAL THERAPY EVALUATION  Patient: Bella Clemens (4 y.o. female)  Date: 10/9/2022  Primary Diagnosis: Respiratory failure ([facility] [address] [J96.90]       Precautions: Fall       ASSESSMENT  Based on the objective data described below, the patient presents with increased pain levels (c/o of 9/10 at rest and 10/10 with rolling attempt in bed) and crying in tears with attempt. Patient unable to further participate in OOB mobility due to increased pain levels. Given current presentation due to pain, patient would benefit for additional PT to further assess mobility, gait, and balance. Patient currently on room air in room with O2 saturations ranging from 90-94% throughout today's evaluation. Patient was independent prior to this hospitalization per report.      Current Level of Function Impacting Discharge (mobility/balance): bed mobility min A with use of bed rails and 10/10 p! complaint    Other factors to consider for discharge: Lives with spouse     Patient will benefit from skilled therapy intervention to address the above noted impairments. PLAN :  Recommendations and Planned Interventions: bed mobility training, transfer training, gait training, therapeutic exercises, neuromuscular re-education, modalities, patient and family training/education, and therapeutic activities      Frequency/Duration: Patient will be followed by physical therapy:  5 times a week to address goals. Recommendation for discharge: (in order for the patient to meet his/her long term goals)  To be determined: vs HHPT. Patient with complaint of 10/10 p! Level in abdomen/back and unable to participate fully in today's evaluation due to pain.      This discharge recommendation:  Has not yet been discussed the attending provider and/or case management    IF patient discharges home will need the following DME: to be determined (TBD)         SUBJECTIVE:   Patient stated I am just hurting    OBJECTIVE DATA SUMMARY:   HISTORY:    Past Medical History:   Diagnosis Date    Aortic stenosis 10/27/2010    Aortic valve replaced 10/27/2010    Atrioventricular block, complete ([facility] [address] 10/27/2010    CAD (coronary artery disease)     Cancer ([facility] [address] 2004    h/o breast cancer    Cerebral artery occlusion with cerebral infarction [facility])     Depression     Essential hypertension     Fatigue 10/27/2010    HTN (hypertension) 10/27/2010    S/P cardiac pacemaker procedure 10/27/2010    Unspecified sleep apnea     uses CPAP     Past Surgical History:   Procedure Laterality Date    ABDOMEN SURGERY PROC UNLISTED      tummy tuck    BREAST SURGERY PROCEDURE UNLISTED      mastectomy    HX HEART CATHETERIZATION      HX HEART VALVE SURGERY  3/2010    porcine AVR    HX HERNIA REPAIR      HX PACEMAKER  3/24/2010    [facility] [address], complete heart block    HX TONSILLECTOMY         Personal factors and/or comorbidities impacting plan of care:     Home Situation  Home Environment: Private residence  # Steps to Enter: 5  One/Two Story Residence: Two story  # of Interior Steps: 9  Lift Chair Available: No  Living Alone: Yes  Support Systems: Spouse/Significant Other, Child(opal)  Patient Expects to be Discharged to[de-identified] Home with family assistance  Current DME Used/Available at Home: Walker, rolling    EXAMINATION/PRESENTATION/DECISION MAKING:   Critical Behavior:  Neurologic State: Alert  Orientation Level: Oriented X4  Cognition: Appropriate decision making, Appropriate for age attention/concentration  Safety/Judgement: Awareness of environment    Edema: no edema noted  Range Of Motion:  AROM: Generally decreased, functional (B LEs)        Strength:    Strength: Generally decreased, functional (B LEs)           Tone & Sensation:   Tone: Normal (B LEs)              Sensation: Intact (B LEs)          Vision:    Reading glasses  Functional Mobility:  Bed Mobility:  Rolling: Minimum assistance (use of bed rails; 10/10 p! in abdomen/back with attempt)  Supine to Sit:  (unable due to 10/10 p! reported in abdomen/back)        Transfers:   Unable due to 10/10 p!  With bed mobility attempt          Physical Therapy Evaluation Charge Determination   History Examination Presentation Decision-Making   MEDIUM  Complexity : 1-2 comorbidities / personal factors will impact the outcome/ POC  MEDIUM Complexity : 3 Standardized tests and measures addressing body structure, function, activity limitation and / or participation in recreation  MEDIUM Complexity : Evolving with changing characteristics  MEDIUM Complexity : FOTO score of 26-74      Based on the above components, the patient evaluation is determined to be of the following complexity level: MEDIUM    Pain Rating:  10/10 in abdomen and back    Activity Tolerance:   Poor    After treatment patient left in no apparent distress:   Supine in bed and Call bell within reach    COMMUNICATION/EDUCATION:   The patients plan of care was discussed with: Occupational therapist and Registered nurse. Fall prevention education was provided and the patient/caregiver indicated understanding. and Patient/family have participated as able in goal setting and plan of care.     Thank you for this referral.  Yoli Landa, PT   Time Calculation: 25 mins

## 2022-10-09 NOTE — PROGRESS NOTES
Problem: Self Care Deficits Care Plan (Adult)  Goal: *Acute Goals and Plan of Care (Insert Text)  Description: FUNCTIONAL STATUS PRIOR TO ADMISSION: Patient was independent and active without use of DME. Pt has a RW from previous surgery, however, was not using. Pt is on room air at home. HOME SUPPORT: The patient lived with spouse but did not require assist.    Occupational Therapy Goals  Initiated 10/9/2022  1. Patient will perform lower body dressing using AE PRN for abdominal/back pain with modified independence within 7 day(s). 2.  Patient will perform bathing with modified independence within 7 day(s). 3.  Patient will perform grooming at sink with modified independence within 7 day(s). 4.  Patient will perform toilet transfers with modified independence within 7 day(s). 5.  Patient will perform all aspects of toileting with modified independence within 7 day(s). Outcome: Progressing Towards Goal     OCCUPATIONAL THERAPY EVALUATION  Patient: Nikki Wilkerson (79 y.o. female)  Date: 10/9/2022  Primary Diagnosis: Respiratory failure ([facility] [address] [J96.90]       Precautions: fall       ASSESSMENT  Based on the objective data described below, the patient presents with increased back and abdominal pain, limiting pt's bed mobility, functional mobility and independence with ADLs. Pt received supine on room air with SpO2 >90%, despite pt being intermittently drowsy during assessment questioning. Verbally educated pt on log roll technique, pt with increased pain flexing L LE and during attempt to roll to her R, pt with 10/10 pain. Pt unable to tolerate further movement, crying and therefore, supine to sit transfer aborted. Pt is limited in ADLs at this time due to above. Hope for discharge home with HHOT/PT once pain is better managed and abdominal discomfort has improved. Spoke with RN and informed pt requesting Tramadol dose (due).      Current Level of Function Impacting Discharge (ADLs/self-care): min A for rolling, 10/10 pain with rolling, 9/10 at rest    Functional Outcome Measure: The patient scored Total: 65/100 on the Barthel Index outcome measure       Other factors to consider for discharge: lives at home     Patient will benefit from skilled therapy intervention to address the above noted impairments. PLAN :  Recommendations and Planned Interventions: self care training, functional mobility training, therapeutic exercise, balance training, therapeutic activities, endurance activities, and patient education    Frequency/Duration: Patient will be followed by occupational therapy 5 times a week to address goals. Recommendation for discharge: (in order for the patient to meet his/her long term goals)  Occupational therapy at least 2 days/week in the home AND ensure assist and/or supervision for safety with IADLs    This discharge recommendation:  Has not yet been discussed the attending provider and/or case management    IF patient discharges home will need the following DME: likely none       SUBJECTIVE:   Patient stated I am fine when I am home.     OBJECTIVE DATA SUMMARY:   HISTORY:   Past Medical History:   Diagnosis Date    Aortic stenosis 10/27/2010    Aortic valve replaced 10/27/2010    Atrioventricular block, complete ([facility] [address] 10/27/2010    CAD (coronary artery disease)     Cancer ([facility] [address] 2004    h/o breast cancer    Cerebral artery occlusion with cerebral infarction [facility])     Depression     Essential hypertension     Fatigue 10/27/2010    HTN (hypertension) 10/27/2010    S/P cardiac pacemaker procedure 10/27/2010    Unspecified sleep apnea     uses CPAP     Past Surgical History:   Procedure Laterality Date    ABDOMEN SURGERY PROC UNLISTED      tummy [facility]    BREAST SURGERY PROCEDURE UNLISTED      mastectomy    HX HEART CATHETERIZATION      HX HEART VALVE SURGERY  3/2010    porcine AVR    HX HERNIA REPAIR      HX PACEMAKER  3/24/2010    [facility], complete heart block    HX TONSILLECTOMY Expanded or extensive additional review of patient history:     Home Situation  Home Environment: Private residence  # Steps to Enter: 5  One/Two Story Residence: Two story  # of Interior Steps: 9  Lift Chair Available: No  Living Alone: Yes  Support Systems: Spouse/Significant Other, Child(opal)  Patient Expects to be Discharged to[de-identified] Home with family assistance  Current DME Used/Available at Home: Walker, rolling    Hand dominance: Right    EXAMINATION OF PERFORMANCE DEFICITS:  Cognitive/Behavioral Status:  Neurologic State: Alert  Orientation Level: Oriented X4  Cognition: Appropriate decision making; Appropriate for age attention/concentration  Perception: Appears intact  Perseveration: No perseveration noted  Safety/Judgement: Awareness of environment    Skin: intact    Edema: abdominal distention and discomfort    Hearing:       Vision/Perceptual:                           Acuity: Within Defined Limits    Corrective Lenses: Reading glasses    Range of Motion:    AROM: Generally decreased, functional (B LEs)                         Strength:    Strength: Generally decreased, functional (B LEs)                Coordination:     Fine Motor Skills-Upper: Left Intact; Right Intact    Gross Motor Skills-Upper: Left Intact; Right Intact    Tone & Sensation:    Tone: Normal (B LEs)  Sensation: Intact (B LEs)                      Balance:  Sitting:  (unable to tolerate today)    Functional Mobility and Transfers for ADLs:  Bed Mobility:  Rolling: Minimum assistance (use of bed rails; 10/10 p! in abdomen/back with attempt)  Supine to Sit:  (unable due to 10/10 p! reported in abdomen/back)    Transfers:       ADL Assessment:  Feeding: Setup    Oral Facial Hygiene/Grooming: Setup    Bathing: Moderate assistance (decreased functional reach to feet 2* abdominal pain)         Upper Body Dressing: Setup    Lower Body Dressing:  Moderate assistance    Toileting: Minimum assistance                ADL Intervention and task modifications:                                          Cognitive Retraining  Safety/Judgement: Awareness of environment    Therapeutic Exercise:     Functional Measure:    Barthel Index:  Bathin  Bladder: 10  Bowels: 10  Groomin  Dressin  Feeding: 10  Mobility: 10 (per RN, pt ambulate to bathroom with RW)  Stairs: 0  Toilet Use: 5  Transfer (Bed to Chair and Back): 10  Total: 65/100      The Barthel ADL Index: Guidelines  1. The index should be used as a record of what a patient does, not as a record of what a patient could do. 2. The main aim is to establish degree of independence from any help, physical or verbal, however minor and for whatever reason. 3. The need for supervision renders the patient not independent. 4. A patient's performance should be established using the best available evidence. Asking the patient, friends/relatives and nurses are the usual sources, but direct observation and common sense are also important. However direct testing is not needed. 5. Usually the patient's performance over the preceding 24-48 hours is important, but occasionally longer periods will be relevant. 6. Middle categories imply that the patient supplies over 50 per cent of the effort. 7. Use of aids to be independent is allowed. Score Interpretation (from [facility])    Independent   60-79 Minimally independent   40-59 Partially dependent   20-39 Very dependent   <20 Totally dependent     -Noe Mahan., Barthel, D.W. (1965). Functional evaluation: the Barthel Index. [facility] ([address] (1997). The Barthel activities of daily living index: self-reporting versus actual performance in the old (> or = 75 years). Journal of [address] [place] [address] 45(7), 14 [facility], J.JMARYF, Marcia Morrison., Romana Just. (1999).  Measuring the change in disability after inpatient rehabilitation; comparison of the responsiveness of the Barthel Index and Functional [place] Measure. Journal of Neurology, Neurosurgery, and Psychiatry, 66(4), 798-822. HADLEY Astudillo, LICHA Chambers, & Alicia Daly M.A. (2004) Assessment of post-stroke quality of life in cost-effectiveness studies: The usefulness of the Barthel Index and the EuroQoL-5D. Quality of Life Research, 15, 233-61     Occupational Therapy Evaluation Charge Determination   History Examination Decision-Making   LOW Complexity : Brief history review  MEDIUM Complexity : 3-5 performance deficits relating to physical, cognitive , or psychosocial skils that result in activity limitations and / or participation restrictions MEDIUM Complexity : Patient may present with comorbidities that affect occupational performnce. Miniml to moderate modification of tasks or assistance (eg, physical or verbal ) with assesment(s) is necessary to enable patient to complete evaluation       Based on the above components, the patient evaluation is determined to be of the following complexity level: LOW   Pain Ratin/10 at rest, 10/10 with attempts to roll to R    Activity Tolerance:   Good    After treatment patient left in no apparent distress:    Supine in bed, Heels elevated for pressure relief, and Call bell within reach    COMMUNICATION/EDUCATION:   The patients plan of care was discussed with: Physical therapist and Registered nurse. Patient/family have participated as able in goal setting and plan of care. This patients plan of care is appropriate for delegation to [facility] [place].     Thank you for this referral.  Mara Mansfield OT  Time Calculation: 14 mins

## 2022-10-09 NOTE — PROGRESS NOTES
Bedside and Verbal shift change report given to Wilma Choe   (oncoming nurse) by Tigist Fischer RN (offgoing nurse). Report included the following information SBAR, Kardex, and Cardiac Rhythm   .

## 2022-10-10 VITALS
TEMPERATURE: 98.4 F | HEART RATE: 64 BPM | SYSTOLIC BLOOD PRESSURE: 145 MMHG | WEIGHT: 167.77 LBS | OXYGEN SATURATION: 96 % | BODY MASS INDEX: 27.95 KG/M2 | DIASTOLIC BLOOD PRESSURE: 78 MMHG | RESPIRATION RATE: 17 BRPM | HEIGHT: 65 IN

## 2022-10-10 LAB
ANION GAP SERPL CALC-SCNC: 4 MMOL/L (ref 5–15)
BASOPHILS # BLD: 0.1 K/UL (ref 0–0.1)
BASOPHILS NFR BLD: 1 % (ref 0–1)
BUN SERPL-MCNC: 19 MG/DL (ref 6–20)
BUN/CREAT SERPL: 19 (ref 12–20)
CALCIUM SERPL-MCNC: 9.9 MG/DL (ref 8.5–10.1)
CHLORIDE SERPL-SCNC: 97 MMOL/L (ref 97–108)
CO2 SERPL-SCNC: 31 MMOL/L (ref 21–32)
CREAT SERPL-MCNC: 0.99 MG/DL (ref 0.55–1.02)
DIFFERENTIAL METHOD BLD: ABNORMAL
EOSINOPHIL # BLD: 0.1 K/UL (ref 0–0.4)
EOSINOPHIL NFR BLD: 1 % (ref 0–7)
ERYTHROCYTE [DISTWIDTH] IN BLOOD BY AUTOMATED COUNT: 12.8 % (ref 11.5–14.5)
EST. AVERAGE GLUCOSE BLD GHB EST-MCNC: 146 MG/DL
GLUCOSE SERPL-MCNC: 125 MG/DL (ref 65–100)
HBA1C MFR BLD: 6.7 % (ref 4–5.6)
HCT VFR BLD AUTO: 34.3 % (ref 35–47)
HGB BLD-MCNC: 11 G/DL (ref 11.5–16)
IMM GRANULOCYTES # BLD AUTO: 0.1 K/UL (ref 0–0.04)
IMM GRANULOCYTES NFR BLD AUTO: 1 % (ref 0–0.5)
LYMPHOCYTES # BLD: 1.4 K/UL (ref 0.8–3.5)
LYMPHOCYTES NFR BLD: 13 % (ref 12–49)
MCH RBC QN AUTO: 30.8 PG (ref 26–34)
MCHC RBC AUTO-ENTMCNC: 32.1 G/DL (ref 30–36.5)
MCV RBC AUTO: 96.1 FL (ref 80–99)
MONOCYTES # BLD: 1.2 K/UL (ref 0–1)
MONOCYTES NFR BLD: 11 % (ref 5–13)
NEUTS SEG # BLD: 7.5 K/UL (ref 1.8–8)
NEUTS SEG NFR BLD: 73 % (ref 32–75)
NRBC # BLD: 0 K/UL (ref 0–0.01)
NRBC BLD-RTO: 0 PER 100 WBC
PLATELET # BLD AUTO: 251 K/UL (ref 150–400)
PMV BLD AUTO: 9.6 FL (ref 8.9–12.9)
POTASSIUM SERPL-SCNC: 4.3 MMOL/L (ref 3.5–5.1)
RBC # BLD AUTO: 3.57 M/UL (ref 3.8–5.2)
SODIUM SERPL-SCNC: 132 MMOL/L (ref 136–145)
WBC # BLD AUTO: 10.3 K/UL (ref 3.6–11)

## 2022-10-10 PROCEDURE — 74011250637 HC RX REV CODE- 250/637: Performed by: STUDENT IN AN ORGANIZED HEALTH CARE EDUCATION/TRAINING PROGRAM

## 2022-10-10 PROCEDURE — 97116 GAIT TRAINING THERAPY: CPT

## 2022-10-10 PROCEDURE — 74011250636 HC RX REV CODE- 250/636: Performed by: INTERNAL MEDICINE

## 2022-10-10 PROCEDURE — 80048 BASIC METABOLIC PNL TOTAL CA: CPT

## 2022-10-10 PROCEDURE — 36415 COLL VENOUS BLD VENIPUNCTURE: CPT

## 2022-10-10 PROCEDURE — 74011250637 HC RX REV CODE- 250/637: Performed by: INTERNAL MEDICINE

## 2022-10-10 PROCEDURE — 85025 COMPLETE CBC W/AUTO DIFF WBC: CPT

## 2022-10-10 PROCEDURE — 74011000250 HC RX REV CODE- 250: Performed by: INTERNAL MEDICINE

## 2022-10-10 PROCEDURE — 83036 HEMOGLOBIN GLYCOSYLATED A1C: CPT

## 2022-10-10 RX ORDER — POLYETHYLENE GLYCOL 3350 17 G/17G
17 POWDER, FOR SOLUTION ORAL DAILY
Qty: 30 PACKET | Refills: 0 | Status: SHIPPED | OUTPATIENT
Start: 2022-10-10 | End: 2022-10-10

## 2022-10-10 RX ORDER — DICYCLOMINE HYDROCHLORIDE 10 MG/1
10 CAPSULE ORAL
Qty: 28 CAPSULE | Refills: 0 | Status: SHIPPED | OUTPATIENT
Start: 2022-10-10 | End: 2022-10-17

## 2022-10-10 RX ORDER — SENNOSIDES 8.6 MG/1
1 TABLET ORAL DAILY
Qty: 30 TABLET | Refills: 0 | Status: SHIPPED | OUTPATIENT
Start: 2022-10-10 | End: 2022-10-10 | Stop reason: SDUPTHER

## 2022-10-10 RX ORDER — SENNOSIDES 8.6 MG/1
1 TABLET ORAL 2 TIMES DAILY
Qty: 60 TABLET | Refills: 0 | Status: SHIPPED | OUTPATIENT
Start: 2022-10-10 | End: 2022-11-09

## 2022-10-10 RX ORDER — TRAMADOL HYDROCHLORIDE 50 MG/1
50 TABLET ORAL
Qty: 9 TABLET | Refills: 0 | Status: SHIPPED | OUTPATIENT
Start: 2022-10-10 | End: 2022-10-13

## 2022-10-10 RX ORDER — LACTULOSE 10 G/15ML
20 SOLUTION ORAL 2 TIMES DAILY
Qty: 1800 ML | Refills: 0 | Status: SHIPPED | OUTPATIENT
Start: 2022-10-10 | End: 2022-11-09

## 2022-10-10 RX ADMIN — TRAMADOL HYDROCHLORIDE 50 MG: 50 TABLET, COATED ORAL at 01:12

## 2022-10-10 RX ADMIN — TRAMADOL HYDROCHLORIDE 50 MG: 50 TABLET, COATED ORAL at 08:37

## 2022-10-10 RX ADMIN — SODIUM CHLORIDE, PRESERVATIVE FREE 10 ML: 5 INJECTION INTRAVENOUS at 06:43

## 2022-10-10 RX ADMIN — DICYCLOMINE HYDROCHLORIDE 10 MG: 10 CAPSULE ORAL at 08:34

## 2022-10-10 RX ADMIN — METOPROLOL SUCCINATE 25 MG: 25 TABLET, EXTENDED RELEASE ORAL at 08:34

## 2022-10-10 RX ADMIN — ASPIRIN 81 MG 81 MG: 81 TABLET ORAL at 08:34

## 2022-10-10 RX ADMIN — LOSARTAN POTASSIUM 25 MG: 25 TABLET, FILM COATED ORAL at 08:34

## 2022-10-10 RX ADMIN — LACTULOSE 45 ML: 20 SOLUTION ORAL at 09:02

## 2022-10-10 RX ADMIN — ENOXAPARIN SODIUM 40 MG: 100 INJECTION SUBCUTANEOUS at 08:35

## 2022-10-10 RX ADMIN — BUMETANIDE 1 MG: 1 TABLET ORAL at 08:34

## 2022-10-10 RX ADMIN — ATORVASTATIN CALCIUM 40 MG: 40 TABLET, FILM COATED ORAL at 08:34

## 2022-10-10 RX ADMIN — TRAMADOL HYDROCHLORIDE 50 MG: 50 TABLET, COATED ORAL at 14:10

## 2022-10-10 NOTE — NURSE NAVIGATOR
HEART FAILURE NURSE NAVIGATOR NOTE  [facility]    Patient chart was reviewed by Heart Failure (HF) Nurse Navigators for compliance of prescribed treatment with guidelines directed medical therapy (GDMT) and HF database completed. Please, review beneath recommendations for symptomatic patients with HF with Preserved Ejection Fraction ? 50% (HFpEF) for your consideration when taking care of this patient. Current HF Medical Therapy:      Name Vicky Arreaga    1945   LVEF 56/60% (echo 2019)   NYHA Functional Class II/III   ARNi/ACEi/ARB Losartan 25 mg daily   Aldosterone Antagonist    SGLT2 inhibitor    Consulting Cardiologist Dr. Sarah Beth Babb (CAV)     Recommendations for HF Management:    For patients with HFpEF ? 50%, consider adding the following GDMT, if appropriate:  SGLT2 inhibitor [Class 2a]  ARNi or ARB [Class 2b]  Aldosterone antagonist [Class 2b]  Adjust antihypertensive therapy [Class 1]  Adjust diuretic dose at discharge if hospitalized for volume overload [Class 1]  For patient with hyperkalemia while on RAASi > 5.5, consider adding potassium binders (patiromer, sodium zirconium cycosilicate) [Class 2a]    Patients with suspected cardiac amyloidosis (older > 61years old with LVH > 1.2cm and/or any other signs of amyloidosis) should be offered screening labs and imaging [Class 1]: (a) serum gammopathy profile and UPEP with immunofixation, and (b) PYP test. If PYP test is positive patient should have genetic testing done for inherited ATTR amyloidosis. If any findings are positive or you need genetic testing ordered, please, consider in-patient consultation or referral to [address] [place] [address]. Note that the following medications may be potentially harmful in heart failure [Class 3].   Calcium channel blockers (doxazosin, diltiazem, verapamil, nifedipine)  Antiarrhythmics (flecanide, disopyrimide, sotalol, dronedarone)  Diabetes medications (thiasolidinediones, saxagliptin, alogliptin)  NSAIDs and MORALES 2 inhibitors    Consider vaccinations for respiratory illnesses (flu, pneumonia, covid) [Class 2b]    Due to h/o recurrent hospitalizations this patient may benefit from referral to Advanced Heart Failure Program to assess suitability for advanced therapies, such as left-ventricular assist device, heart transplantation, palliative inotropes or palliation [Class 1]. To obtain in-patient consultation or refer to [address] [place] [address], call [phone]    Patient Heart Failure Education:     Teach back in heart failure education provided, including information about medical therapy, lifestyle modifications, diet and fluid restrictions, physical activity. Educational resources provided: Living with Heart Failure booklet; Signs/Symptoms magnet; Weight Calendar; [facility] flyer; Preparing for Successful Discharge check list.  Information provided about HF support group. Heart failure avoiding triggers on discharge instructions. Plan for Transitional Care:    Post discharge follow up phone call to be made within 48-72 hours of discharge. Patient will follow-up with PCP. Patient will follow-up with Primary Cardiologist.  Patient screened for obstructive sleep apnea and referred to Sleep Medicine. Referral/follow-up with Cardiac Rehabilitation. Referral/follow-up with Advanced Heart Failure Specialist.  Referral/follow-up with Palliative Care Specialist.        Heart Failure Nurse Navigator  Phone: [phone] / [phone]    *Recommendations listed above are based on the guidelines: 2022 AHA/ACC/HFSA Guideline for the Management of Heart Failure: A Report of the [address] on Clinical Practice Guidelines. Circulation 2022; [id number].  and 2017 AHA/ACC/HRS guideline for management of patients with ventricular arrhythmias and the prevention of sudden cardiac death: A Report of the [employer] of Cardiology/American Heart Association Task Force on Clinical Practice Guidelines and the Heart Rhythm Society.  Heart Rhythm, Vol 15, No 10, October 2018 *Class of Recommendation: Class 1 (strong), Class 2a (moderate), Class 2b (weak), Class 3 (not recommended, potentially harmful)

## 2022-10-10 NOTE — DISCHARGE SUMMARY
Discharge Summary       PATIENT ID: Amy López  MRN: [id number]   YOB: 1945    DATE OF ADMISSION: 10/7/2022  8:36 AM    DATE OF DISCHARGE: 10/10/22    PRIMARY CARE PROVIDER: Mark Velasco MD     ATTENDING PHYSICIAN: Rosa Akers MD   DISCHARGING PROVIDER: Rosa Akers MD    To contact this individual call [phone] and ask the  to page. If unavailable ask to be transferred the Adult Hospitalist Department. CONSULTATIONS: IP CONSULT TO CARDIOLOGY    PROCEDURES/SURGERIES: * No surgery found *    ADMITTING DIAGNOSES & HOSPITAL COURSE:    HPI: \"64year-old woman with past medical history significant for congestive heart failure, dyslipidemia, hypertension, aortic stenosis; status post aortic valve replacement with bioprosthetic, obstructive sleep apnea, status post pacemaker insertion secondary to AV block presented at the [facility] emergency room at [place] because of abdominal pain. The pain started a few days ago. The pain is constant dull ache with radiation to the back, 9/10 in severity. No known aggravating or relieving factors. The patient stated that she was recently diagnosed with congestive heart failure and her cardiologist started her on Jardiance. The patient believed that the [place] Edinger is what is causing her symptoms. She talked to her cardiologist, the dosage was reduced, and despite the reduced dosage, the patient continued to experience body aches and pain. She stopped the medication on her own. When the patient arrived at the emergency room, the patient was found to be hypoxic. She was placed on supplemental oxygen. Her BNP level was markedly elevated. She was referred to the hospitalist service for admission. She was last admitted to the hospital from 09/29/2019 to 10/01/2019. The patient was admitted for evaluation and treatment of acute CVA. \"        DISCHARGE DIAGNOSES / PLAN:      Chronic HFpEF  Abdominal pain 2/2 severe constipation   Chronic osteoarthritis  Status post pacemaker  AS s/p TAVR  -- Abdominal pain is likely from constipation multiple BM overnight and pain improved   -X ray lumbar spine neg, KUB shows large stool burden  -bladder scanned 2x not retaining   -cont lactuose will do another enema this am, s/p std miralax senokot, and enema yesterday will give paitent enema kit to do at home lactulose and senokot on dc as well, explained to patient that chronic constipation can be managed op can fu with her pcp for this   - As needed pain control patient will need to be evaluated outpatient for long-term evaluation and management of her osteoarthritis  - would recommend caution with opioids as can worsen constipation  - Greatly appreciate cardiology evaluation, stable for discharge from their standpoint resume home medications can follow-up outpatient with [address] [place] [address] cardiology and get her echo done outpatient canceled here  -Stay off Jardiance     PT OT evaluation > HH     Dc w/ HH          FOLLOW UP APPOINTMENTS:    Follow-up Information       Follow up With Specialties Details Why Contact Info    Elizabeth Luevano MD Internal Medicine Physician Go on 10/17/2022 AT 10:00 am.  YOU WILL SEE MARIBELL FUCHS NP AT THE APPOINTMENT ON 10/17/22 [address] [facility] [id number] [phone]      [address] Cardiology  Call in 1 day(s)  [address] [facility]  [phone]             ADDITIONAL CARE RECOMMENDATIONS: rpt cbc and bmp 3-5 day     DIET: Resume previous diet    ACTIVITY: Activity as tolerated      DISCHARGE MEDICATIONS:  Current Discharge Medication List        START taking these medications    Details   lactulose (CHRONULAC) 20 gram/30 mL soln solution Take 30 mL by mouth two (2) times a day for 30 days.   Qty: 1800 mL, Refills: 0  Start date: 10/10/2022, End date: 11/9/2022      traMADoL (ULTRAM) 50 mg tablet Take 1 Tablet by mouth every eight (8) hours as needed for Pain for up to 3 days. Max Daily Amount: 150 mg.  Qty: 9 Tablet, Refills: 0  Start date: 10/10/2022, End date: 10/13/2022    Associated Diagnoses: Abdominal pain, generalized      senna (Senokot) 8.6 mg tablet Take 1 Tablet by mouth two (2) times a day for 30 days. Qty: 60 Tablet, Refills: 0  Start date: 10/10/2022, End date: 11/9/2022      metoprolol succinate (TOPROL-XL) 25 mg XL tablet Take 1 Tablet by mouth daily for 30 days. Qty: 30 Tablet, Refills: 0  Start date: 10/9/2022, End date: 11/8/2022           CONTINUE these medications which have CHANGED    Details   !! dicyclomine (BentyL) 10 mg capsule Take 1 Capsule by mouth four (4) times daily as needed for Abdominal Cramps, Cramping or Pain for up to 7 days. Qty: 28 Capsule, Refills: 0  Start date: 10/10/2022, End date: 10/17/2022       !! - Potential duplicate medications found. Please discuss with provider. CONTINUE these medications which have NOT CHANGED    Details   apixaban (ELIQUIS) 5 mg tablet Take 5 mg by mouth two (2) times a day. One tablet per day      atorvastatin (LIPITOR) 40 mg tablet Take 1 Tab by mouth daily. Qty: 30 Tab, Refills: 0      bumetanide (BUMEX) 1 mg tablet Take 1 mg by mouth daily. !! dicyclomine (BENTYL) 10 mg capsule Take 10 mg by mouth as needed. trazodone (DESYREL) 50 mg tablet Take 50 mg by mouth nightly as needed. aspirin 81 mg chewable tablet Take 81 mg by mouth daily. !! - Potential duplicate medications found. Please discuss with provider. STOP taking these medications       metoprolol tartrate (LOPRESSOR) 50 mg tablet Comments:   Reason for Stopping:         clopidogrel (PLAVIX) 75 mg tab Comments:   Reason for Stopping:                 NOTIFY YOUR PHYSICIAN FOR ANY OF THE FOLLOWING:   Fever over 101 degrees for 24 hours. Chest pain, shortness of breath, fever, chills, nausea, vomiting, diarrhea, change in mentation, falling, weakness, bleeding.  Severe pain or pain not relieved by medications. Or, any other signs or symptoms that you may have questions about. DISPOSITION:    Home With:   OT  PT  HH  RN       Long term SNF/Inpatient Rehab   x Independent/assisted living    Hospice    Other:       PATIENT CONDITION AT DISCHARGE:     Functional status    Poor    x Deconditioned     Independent      Cognition    x Lucid     Forgetful     Dementia      Catheters/lines (plus indication)    Ferrer     PICC     PEG    x None      Code status   x  Full code     DNR      PHYSICAL EXAMINATION AT DISCHARGE:  I had a face to face encounter with this patient and independently examined them on 10/10/2022 as outlined below:                                                       Constitutional:  No acute distress, cooperative, pleasant    ENT:  Oral mucosa moist, oropharynx benign. Resp:  CTA bilaterally. No wheezing/rhonchi/rales. No accessory muscle use. CV:  Regular rhythm, normal rate, no murmurs, gallops, rubs    GI:  Soft, non distended, non tender. normoactive bowel sounds, no hepatosplenomegaly     Musculoskeletal:  No edema, warm, 2+ pulses throughout    Neurologic:  Moves all extremities.   AAOx3, CN II-XII reviewed                                 CHRONIC MEDICAL DIAGNOSES:  Problem List as of 10/10/2022 Date Reviewed: 10/8/2022            Codes Class Noted - Resolved    Arthritis (Chronic) ICD-10-CM: M19.90  ICD-9-CM: 716.90  10/9/2022 - Present        * (Principal) Acute on chronic heart failure with preserved ejection fraction ([facility] ICD-10-CM: I50.33  ICD-9-CM: 428.23  10/8/2022 - Present        Respiratory failure ([facility] ICD-10-CM: J96.90  ICD-9-CM: 518.81  10/7/2022 - Present        Acute CVA (cerebrovascular accident) ([facility] ICD-10-CM: I63.9  ICD-9-CM: 434.91  9/29/2019 - Present        Lesion of plantar nerve ICD-10-CM: G57.60  ICD-9-CM: 355.6  10/18/2012 - Present        Aortic valve replaced ICD-10-CM: Z95.2  ICD-9-CM: V43.3  10/27/2010 - Present    Overview Signed 1/31/2011  9:14 PM by Kolton Mahoney MD     For severe AS. Porcine aortic valve replacement with Dr. Bryce Johnson in March 2010             Atrioventricular block, complete [facility]) ICD-10-CM: I44.2  ICD-9-CM: 426.0  10/27/2010 - Present        HTN (hypertension) ICD-10-CM: I10  ICD-9-CM: 401.9  10/27/2010 - Present        Cardiac pacemaker in situ ICD-10-CM: Z95.0  ICD-9-CM: V45.01  10/27/2010 - Present    Overview Addendum 1/31/2011  9:16 PM by Kolton Mahoney MD     Complete heart block with slow ventricular response and loss of capture despite high output from the temporary pacemaker wire and, therefore, the pacemaker had to be urgently implanted permanently. [employer] Steve pacemaker 3/2010  persistent elevation of the right ventricular capture threshold post implant. Echo 4/2010: This is a limitted 2 D echocardiogram to assess for chest pain after pacemaker implant. Valve structures and functions are not spefically evaluated. No pericardial effusion. Left ventricular wall thickness is abnormal with evidence of moderate concentric left ventricular hypertrophy. A bioprosthetic aortic valve appears well seated.                  RESOLVED: Fatigue ICD-10-CM: R53.83  ICD-9-CM: 780.79  10/27/2010 - 3/19/2011        RESOLVED: Aortic stenosis ICD-10-CM: I35.0  ICD-9-CM: 424.1  10/27/2010 - 1/31/2011        RESOLVED: S/P cardiac pacemaker procedure ICD-10-CM: Z95.0  ICD-9-CM: V45.01  10/27/2010 - 3/19/2011           Greater than 30 minutes were spent with the patient on counseling and coordination of care    Signed:   Julio C Samuel MD  10/10/2022  1:06 PM

## 2022-10-10 NOTE — PROGRESS NOTES
Problem: Mobility Impaired (Adult and Pediatric)  Goal: *Acute Goals and Plan of Care (Insert Text)  Description: FUNCTIONAL STATUS PRIOR TO ADMISSION: Patient was independent and active without use of DME. Owns a RW from a previous surgery years ago, but does not use. HOME SUPPORT PRIOR TO ADMISSION: The patient lived with spouse but did not require assist.    Physical Therapy Goals  Initiated 10/9/2022  1. Patient will move from supine to sit and sit to supine  in bed with modified independence within 7 day(s). 2.  Patient will transfer from bed to chair and chair to bed with modified independence using the least restrictive device within 7 day(s). 3.  Patient will perform sit to stand with modified independence within 7 day(s). 4.  Patient will ambulate with modified independence for 150 feet with the least restrictive device within 7 day(s). 5.  Patient will ascend/descend 5 stairs with 1 handrail(s) with modified independence within 7 day(s). Outcome: Progressing Towards Goal   PHYSICAL THERAPY TREATMENT  Patient: Robinson Pickett (52 y.o. female)  Date: 10/10/2022  Diagnosis: Respiratory failure ([facility] [J96.90] Acute on chronic heart failure with preserved ejection fraction (HCC)        Chart, physical therapy assessment, plan of care and goals were reviewed. ASSESSMENT  Patient continues with skilled PT services and is gradually progressing towards goals. Pt initially declined therapy due to back pain however following explanation and goal of visit pt agreed to participate. Pt in side lying and completed log roll technique with minimal assistance to achieve sitting EOB. Pt stood by pulling up on walker despite several cues for hand placement. Pt ambulated with rolling walker for a good distance with fair tolerance, 6/10 back pain. Pt able to ascend/descend 5 stairs with heavy use of hand rail due to increased back pain especially when ascending stairs.  Pt returned to room and agreeable to sit in chair for lunch. Pt continues to have limited mobility due to back pain however gradually improving. Pt reports transfers are the most painful and did require light assistance. Pt will have assistance from her spouse and granddaughter at home. Pt is cleared for discharge from a PT standpoint. Current Level of Function Impacting Discharge (mobility/balance): bed mobility with minimal assistance, sit <> stand transfers and ambulation with rolling walker x 150 feet with CGA    Other factors to consider for discharge: below her baseline, will need assistance for bed mobility at home         PLAN :  Patient continues to benefit from skilled intervention to address the above impairments. Continue treatment per established plan of care. to address goals. Recommendation for discharge: (in order for the patient to meet his/her long term goals)  Physical therapy at least 2 days/week in the home AND ensure assist and/or supervision for safety with mobility    This discharge recommendation:  Has been made in collaboration with the attending provider and/or case management    IF patient discharges home will need the following DME: patient owns DME required for discharge       SUBJECTIVE:   Patient stated I don't think my back pain is from constipation    OBJECTIVE DATA SUMMARY:   Critical Behavior:  Neurologic State: Alert  Orientation Level: Oriented X4  Cognition: Follows commands  Safety/Judgement: Awareness of environment  Functional Mobility Training:  Bed Mobility:     Supine to Sit: Minimum assistance;Assist x1;Additional time; Adaptive equipment              Transfers:  Sit to Stand: Contact guard assistance;Assist x1  Stand to Sit: Contact guard assistance;Assist x1         Provided cues for hand placement however pt did not follow through                    Balance:  Sitting: Intact  Standing: Impaired  Standing - Static: Constant support;Good  Standing - Dynamic : Constant support;Good  Ambulation/Gait Training:     Assistive Device: Gait belt;Walker, rolling  Ambulation - Level of Assistance: Contact guard assistance;Assist x1   Distance: 150 feet     Gait Abnormalities: Antalgic;Decreased step clearance              Speed/Krystyna: Slow  Step Length: Right shortened;Left shortened              Stairs:  Number of Stairs Trained: 5  Stairs - Level of Assistance: Contact guard assistance;Assist X1   Rail Use: Right     Pain Rating:  10/10 with transfers, 6/10 with ambulation, back    Activity Tolerance:   Good    After treatment patient left in no apparent distress:   Sitting in chair and Call bell within reach    COMMUNICATION/COLLABORATION:   The patients plan of care was discussed with: Registered nurse.      Destiny Rolle   Time Calculation: 18 mins

## 2022-10-10 NOTE — PROGRESS NOTES
2000 Report received from [address], [facility]. Patient alert and oriented, resting in bed.    3758 Bedside shift change report given to [facility] by Finn Montague RN. Report included the following information SBAR, Kardex, MAR, Accordion, and Recent Results. Problem: Falls - Risk of  Goal: *Absence of Falls  Description: Document Chica Roth Fall Risk and appropriate interventions in the flowsheet.   Outcome: Progressing Towards Goal  Note: Fall Risk Interventions:  Mobility Interventions: Communicate number of staff needed for ambulation/transfer, Patient to call before getting OOB    Medication Interventions: Teach patient to arise slowly, Patient to call before getting OOB, Evaluate medications/consider consulting pharmacy    Elimination Interventions: Call light in reach, Patient to call for help with toileting needs    Problem: Pain  Goal: *Control of Pain  Outcome: Progressing Towards Goal     Problem: Heart Failure: Day 4  Goal: Activity/Safety  Outcome: Progressing Towards Goal

## 2022-10-10 NOTE — PROGRESS NOTES
Transition of Care Plan  RUR- Low  10%   DISPOSITION: The disposition plan is home with family assistance and [address] Accepted: At Home Care   F/U with PCP/Specialist    Transport: Family     Patient/family seen: YES       Informed patient/family of BPCI-A Bundle Program. Also advised of potential outreach by Care Transitions Team.    Bundle Payment Care Improvement Beneficiary Letter Delivered to Beneficiary or Representative:YES. Date BCPI -A was given:10/10/2022      Medicare pt has received, reviewed, and signed 2nd IM letter informing them of their right to appeal the discharge. Signed copy has been placed on pt bedside chart.     CM: [address]. MSW,   [phone]

## 2022-10-10 NOTE — PROGRESS NOTES
I have reviewed discharge instructions with the patient. The patient verbalized understanding.  Opportunity for questions and clarification provided

## 2022-10-10 NOTE — DISCHARGE INSTRUCTIONS
Download the Heart Failure Waterfall Christal: Search in your Spar[employer] (Android) or [employer] Christal Store ([employer]phone): Search for- HF Waterfall Christal.    [employer]    HF Waterfall is a brand-new phone christal that helps you track daily symptoms, vitals, mood, energy level and more. You can even add your heart failure care team members to view your data and monitor your condition at home.     HF Waterfall lets you:  Track symptoms, medications and more  Share health information with your health care team  Connect with others living with heart failure

## 2022-10-10 NOTE — PROGRESS NOTES
Patient/family seen: YES       Informed patient/family of BPCI-A Bundle Program. Also advised of potential outreach by Care Transitions Team.    Bundle Payment Care Improvement Beneficiary Letter Delivered to Beneficiary or Representative:YES.  Date BCPI -A was given:10/10/22

## 2022-10-11 ENCOUNTER — PATIENT OUTREACH (OUTPATIENT)
Dept: CASE MANAGEMENT | Age: 77
End: 2022-10-11

## 2022-10-11 NOTE — PROGRESS NOTES
Care Transitions Initial Call    Call within 2 business days of discharge: Yes     Patient: Fouzia Reece Patient : 1945 MRN: [id number]    Last Discharge  Street       Date Complaint Diagnosis Description Type Department Provider    10/7/22 Back Pain; Abdominal Pain Hypoxia . .. ED to Hosp-Admission (Discharged) (ADMIT) Desi Shabazz MD; Emerita [facility] .. Was this an external facility discharge? No   Challenges to be reviewed by the provider     Patient was upset when Ctn contact hung up on CTN in the middle of the call. CTN was unable to complete full CALEB call. Method of communication with provider : phone    Discussed [id number] related testing which was not done at this time. Advance Care Planning:   Does patient have an Advance Directive: unable to assess during this call     Inpatient Readmission Risk score: Unplanned Readmit Risk Score: 9.8    Was this a readmission? no       Patients top risk factors for readmission: lack of knowledge about disease and utilization of services   Interventions to address risk factors: Obtained and reviewed discharge summary and/or continuity of care documents    Care Transition Nurse (CTN) contacted the patient by telephone to perform post hospital discharge assessment. Verified name and  with patient as identifiers. Provided introduction to self, and explanation of the CTN role. Were discharge instructions available to patient? yes. Medication reconciliation was not performed due to patient hanging up the phone     Home Health/Outpatient orders at discharge: At [address] [place] [address] unable to complete call to confirm when patient was see    Durable Medical Equipment ordered at discharge: n/a    Was patient discharged with a pulse oximeter? no    Discussed follow-up appointments. If no appointment was previously scheduled, appointment scheduling offered: yes. Is follow up appointment scheduled within 7 days of discharge? yes.   [facility] follow up appointment(s): No future appointments. Non-[facility] follow up appointment(s): patient stated that she saw Md today but was not sure of the Md names    Plan for follow-up call in 5-7 days based on severity of symptoms and risk factors. Plan for next call: symptom management-assess patients s/s and utilization of services   CTN provided contact information for future needs. Goals Addressed                   This Visit's Progress     Prevent complications post hospitalization. 10/11/22    CTN was in process of completing CALEB call. Patient did state that she was seen by a doctor today but she [facility]. Patient then proceeded to state that she was unhappy with her care. She stated \"you all can't do anything for me\" and then patient hung up the phone. CTN did leave VM for patients provider regarding inability to complete CALEB call   CTN contacted home health company and a verbal message was taken and to be given to the patients home health nurse for CTN to gather more information.      Leilani Aquino, DONY, RN, [facility] [place] Nurse  [phone]

## 2022-10-11 NOTE — PROGRESS NOTES
Physician Progress Note      David Wiseman  CSN #:                  [id number]  :                       1945  ADMIT DATE:       10/7/2022 8:36 AM  DISCH DATE:        10/10/2022 3:11 PM  RESPONDING  PROVIDER #:        Dori Espinal MD          QUERY TEXT:    Patient admitted with abdominal pain secondary to constipation. Acute respiratory failure was documented as an active hospital problem in the problem list and embedded in subsequent Hospitalist progress notes. There does not appear to be documentation of respiratory distress, nor of the pt exhibiting a struggle to breathe or having impaired gas exchange per criteria below. Although the pt initially had pulse oximetry as low as 85 percent on RA on the day of admission, the pt had pulse oximetry of 90-98 percent on RA thereafter. The pt was placed on intermittent NC O2 at 2-3 [address] admission, with pulse oximetry of  percent when using O2. Please document in progress notes the clinical indicators to support this diagnosis on current admission or document if this diagnosis has been ruled out after study. ? Please update active hospital problems appropriately to reflect response. The medical record reflects the following:    Risk Factors: 70-year-old woman with past medical history significant for congestive heart failure, dyslipidemia, hypertension, aortic stenosis; status post aortic valve replacement with bioprosthetic, obstructive sleep apnea, status post pacemaker insertion secondary to AV block    Clinical Indicators:    -- Pulse oximetry = 85% on RA on day of admission, then 90-98% on RA thereafter &  on 2 LPM    -- ED documented:  Denies any chest pain or shortness of breath. Physical Exam:  Constitutional:  General: She is in acute distress. Pulmonary:  Effort: Pulmonary effort is normal.  Breath sounds: Normal breath sounds.     -- Cardiology documented:  She has baseline shortness of breath but this is unchanged. Physical Exam:  General:   Alert, cooperative, no distress, appears stated age. Lungs:    Clear to auscultation bilaterally. -- Hospitalist rounding notes (all, including D/C summary) documented:  Physical Examination:  Constitutional:  No acute distress, cooperative, pleasant  Resp:  CTA bilaterally. No wheezing/rhonchi/rales. No accessory muscle use. -- None of the following were found to be documented in the medical record: increased work of breathing, respiratory distress, tachypnea, dyspnea, accessory muscle use, extreme anxiety, inability to speak in complete sentences    Treatment: Intermittent supplemental O2, Cardiology consult,, pulse oximetry monitoring    Thank-you,  Ranulfo Gallegos RN, [place]  Clinical   Nicholas Cassidy. [email or web address]  [phone]  You can also contact me via [employer]. Acute Respiratory Failure Clinical Indicators per  [facility] Training Guide and Quick Reference Guide:  pO2 < 60 mmHg or SpO2 (pulse oximetry) < 91% breathing room air  pCO2 > 50 and pH < 7.35  P/F ratio (pO2 / FIO2) < 300  pO2 decrease or pCO2 increase by 10 mmHg from baseline (if known)  Supplemental oxygen of 40% or more  Presence of respiratory distress, tachypnea, dyspnea, shortness of breath, wheezing  Unable to speak in complete sentences  Use of accessory muscles to breathe  Extreme anxiety and feeling of impending doom  Tripod position  Confusion/altered mental status/obtunded  Options provided:  -- Acute respiratory failure was currently being treated/evaluated as evidenced by, Please document clinical support.   -- Acute respiratory failure has been ruled out after study  -- Other - I will add my own diagnosis  -- Disagree - Not applicable / Not valid  -- Disagree - Clinically unable to determine / Unknown  -- Refer to Clinical Documentation Reviewer    PROVIDER RESPONSE TEXT:    Acute respiratory failure was currently being treated/evaluated as evidenced by desatted to mid 80s on RA, requriing 02, due to HFref/volume overload from transfusion etc    Query created by: Ruth Little on 10/10/2022 3:26 PM      Electronically signed by:  Glenda Mary MD 10/11/2022 3:08 PM

## 2022-10-20 ENCOUNTER — PATIENT OUTREACH (OUTPATIENT)
Dept: CASE MANAGEMENT | Age: 77
End: 2022-10-20

## 2022-10-22 NOTE — PROGRESS NOTES
Care Transitions Outreach Attempt    Call within 2 business days of discharge: Yes   Attempted to reach patient for transitions of care follow up. Unable to reach patient. Patient: Pieter Mercer Patient : 1945 MRN: [id number]    Last Discharge  Street       Date Complaint Diagnosis Description Type Department Provider    10/7/22 Back Pain; Abdominal Pain Hypoxia . .. ED to Hosp-Admission (Discharged) (ADMIT) Brit Huddleston MD; Kirill Encarnacion. .. Was this an external facility discharge? No     Noted following upcoming appointments from discharge chart review:   [address] follow up appointment(s): No future appointments.   Non-[facility] follow up appointment(s): patient needs to see PCP per chart review

## 2022-11-02 ENCOUNTER — PATIENT OUTREACH (OUTPATIENT)
Dept: CASE MANAGEMENT | Age: 77
End: 2022-11-02

## 2022-11-02 NOTE — PROGRESS NOTES
Care Transitions Outreach Attempt    Call within 2 business days of discharge: Yes   Attempted to reach patient for transitions of care follow up. Unable to reach patient. Patient: Griselda Clarke Patient : 1945 MRN: [id number]    Last Discharge  Street       Date Complaint Diagnosis Description Type Department Provider    10/7/22 Back Pain; Abdominal Pain Hypoxia . .. ED to Hosp-Admission (Discharged) (ADMIT) Sarahy Tristan MD; [employer] [facility]. .. Was this an external facility discharge? No     Noted following upcoming appointments from discharge chart review:   [facility] follow up appointment(s): No future appointments.   Non-[facility] follow up appointment(s): n/a per chart review at this time

## 2022-11-16 ENCOUNTER — PATIENT OUTREACH (OUTPATIENT)
Dept: CASE MANAGEMENT | Age: 77
End: 2022-11-16